# Patient Record
Sex: FEMALE | Race: WHITE
[De-identification: names, ages, dates, MRNs, and addresses within clinical notes are randomized per-mention and may not be internally consistent; named-entity substitution may affect disease eponyms.]

---
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
## 2020-01-21 ENCOUNTER — HOSPITAL ENCOUNTER (EMERGENCY)
Dept: HOSPITAL 47 - EC | Age: 20
Discharge: HOME | End: 2020-01-21
Payer: COMMERCIAL

## 2020-01-21 VITALS
DIASTOLIC BLOOD PRESSURE: 88 MMHG | TEMPERATURE: 98 F | HEART RATE: 108 BPM | SYSTOLIC BLOOD PRESSURE: 116 MMHG | RESPIRATION RATE: 19 BRPM

## 2020-01-21 DIAGNOSIS — O99.89: Primary | ICD-10-CM

## 2020-01-21 DIAGNOSIS — O21.9: ICD-10-CM

## 2020-01-21 DIAGNOSIS — R19.7: ICD-10-CM

## 2020-01-21 DIAGNOSIS — Z32.01: ICD-10-CM

## 2020-01-21 DIAGNOSIS — F17.200: ICD-10-CM

## 2020-01-21 DIAGNOSIS — Z90.49: ICD-10-CM

## 2020-01-21 DIAGNOSIS — Z91.030: ICD-10-CM

## 2020-01-21 DIAGNOSIS — Z87.19: ICD-10-CM

## 2020-01-21 DIAGNOSIS — O99.331: ICD-10-CM

## 2020-01-21 DIAGNOSIS — Z3A.01: ICD-10-CM

## 2020-01-21 DIAGNOSIS — R82.71: ICD-10-CM

## 2020-01-21 LAB
ALBUMIN SERPL-MCNC: 5 G/DL (ref 3.5–5)
ALP SERPL-CCNC: 64 U/L (ref 38–126)
ALT SERPL-CCNC: 34 U/L (ref 4–34)
ANION GAP SERPL CALC-SCNC: 12 MMOL/L
AST SERPL-CCNC: 37 U/L (ref 14–36)
BASOPHILS # BLD AUTO: 0.1 K/UL (ref 0–0.2)
BASOPHILS NFR BLD AUTO: 1 %
BUN SERPL-SCNC: 10 MG/DL (ref 7–17)
CALCIUM SPEC-MCNC: 9.5 MG/DL (ref 8.4–10.2)
CHLORIDE SERPL-SCNC: 108 MMOL/L (ref 98–107)
CO2 SERPL-SCNC: 20 MMOL/L (ref 22–30)
EOSINOPHIL # BLD AUTO: 0.6 K/UL (ref 0–0.7)
EOSINOPHIL NFR BLD AUTO: 6 %
ERYTHROCYTE [DISTWIDTH] IN BLOOD BY AUTOMATED COUNT: 4.75 M/UL (ref 3.8–5.4)
ERYTHROCYTE [DISTWIDTH] IN BLOOD: 12.5 % (ref 11.5–15.5)
GLUCOSE SERPL-MCNC: 95 MG/DL (ref 74–99)
HCG SERPL-MCNC: 46.6 MIU/ML
HCT VFR BLD AUTO: 43.6 % (ref 34–46)
HGB BLD-MCNC: 14.5 GM/DL (ref 11.4–16)
LYMPHOCYTES # SPEC AUTO: 3 K/UL (ref 1–4.8)
LYMPHOCYTES NFR SPEC AUTO: 29 %
MCH RBC QN AUTO: 30.5 PG (ref 25–35)
MCHC RBC AUTO-ENTMCNC: 33.2 G/DL (ref 31–37)
MCV RBC AUTO: 91.8 FL (ref 80–100)
MONOCYTES # BLD AUTO: 0.4 K/UL (ref 0–1)
MONOCYTES NFR BLD AUTO: 4 %
NEUTROPHILS # BLD AUTO: 6.1 K/UL (ref 1.3–7.7)
NEUTROPHILS NFR BLD AUTO: 59 %
PLATELET # BLD AUTO: 309 K/UL (ref 150–450)
POTASSIUM SERPL-SCNC: 4 MMOL/L (ref 3.5–5.1)
PROT SERPL-MCNC: 8.7 G/DL (ref 6.3–8.2)
SODIUM SERPL-SCNC: 140 MMOL/L (ref 137–145)
WBC # BLD AUTO: 10.4 K/UL (ref 4–11)

## 2020-01-21 PROCEDURE — 99284 EMERGENCY DEPT VISIT MOD MDM: CPT

## 2020-01-21 PROCEDURE — 85025 COMPLETE CBC W/AUTO DIFF WBC: CPT

## 2020-01-21 PROCEDURE — 86900 BLOOD TYPING SEROLOGIC ABO: CPT

## 2020-01-21 PROCEDURE — 86901 BLOOD TYPING SEROLOGIC RH(D): CPT

## 2020-01-21 PROCEDURE — 76801 OB US < 14 WKS SINGLE FETUS: CPT

## 2020-01-21 PROCEDURE — 80053 COMPREHEN METABOLIC PANEL: CPT

## 2020-01-21 PROCEDURE — 36415 COLL VENOUS BLD VENIPUNCTURE: CPT

## 2020-01-21 PROCEDURE — 76817 TRANSVAGINAL US OBSTETRIC: CPT

## 2020-01-21 PROCEDURE — 81025 URINE PREGNANCY TEST: CPT

## 2020-01-21 PROCEDURE — 84702 CHORIONIC GONADOTROPIN TEST: CPT

## 2020-01-21 NOTE — US
EXAMINATION TYPE: Transabdominal

 

DATE OF EXAM: 2020 7:34 PM

 

COMPARISON: TV US 2019.

 

CLINICAL HISTORY: +urine preg, abd pain. Abdominal pain x 1 week. Positive pregnancy test. Hx ovarian
 cyst. .

 

EXAM PERFORMED:  Transvaginal (TV) and Transabdominal (TA)

 

EXAM MEASUREMENTS:

 

GESTATIONAL AGE / DATING

 

Physician Established: Not yet established. 

Dates by LMP: (4 weeks/6 days)  

** EDC: 2020

Dates by First Scan:  This is first scan. 

Dates by Current Scan for: No gestational sac seen at this time.

 

MATERNAL ANATOMY 

 

Uterus: 6.9 x 3.9 x 3.4 cm. Anteverted. Appears heterogeneous. Subcentimeter anechoic-hypoechoic area
s seen in cervix.

Right Ovary: 3.4 x 2.3 x 1.6 cm. Follicles seen.

Left Ovary: 2.9 x 2.2 x 1.2 cm. Follicles seen. Largest anechoic area measures: 0.8 x 0.7 x 0.5 cm. 

Post CDS / Adnexa: Appear to be wnl.

Presence of free fluid: Not seen

Presence of corpus luteal cyst: Not seen

 

 

GESTATION / FETAL SURVEY

 

IUP:  No IUP seen at this time

 

Date of LMP: 2019

Beta HcG (if available):  Detected.

 

 

 

 

 

IMPRESSION:

No gestational sac seen. Small follicular ovarian cysts. No adnexal mass.

## 2020-01-21 NOTE — ED
Abdominal Pain HPI





- General


Chief Complaint: Abdominal Pain


Stated Complaint: Abdominal pain


Time Seen by Provider: 20 17:44


Source: patient


Mode of arrival: ambulatory


Limitations: no limitations





- History of Present Illness


Initial Comments: 





Patient is a 19-year-old female,  female presenting to the emergency 

department with a chief complaint of nausea vomiting 2 days.  Patient reports 

she has been "feeling off" over the past 2 days with some nausea but no 

vomiting.  Patient also reports some nonbloody diarrhea.  Patient denies any ch

ills, night sweats or fevers.  Patient states that her last period was over 1 

month.  She is concerned that she is pregnant.  Patient states that she took 3 

pregnancy tests at home and there were positive but she just wants to make sure.

 Patient denies increased urgency or frequency or dysuria.  Denies any vaginal 

bleeding or discharge.





- Related Data


                                Home Medications











 Medication  Instructions  Recorded  Confirmed


 


Norgestrel-Ethinyl Estradiol 1 tab PO DAILY@1800 19





[Low-Ogestrel-28 Tablet]   








                                  Previous Rx's











 Medication  Instructions  Recorded


 


Cephalexin [Keflex] 500 mg PO BID 5 Days #10 cap 20


 


Prenatal Vit-Iron-Folic Acid 1 each PO DAILY #30 cap 20





[Prenatal-U Capsule]  











                                    Allergies











Allergy/AdvReac Type Severity Reaction Status Date / Time


 


venom-honey bee Allergy Severe Rash/Hives Verified 20 17:18


 


bee pollen Allergy  Rash/Hives Verified 20 17:18














Review of Systems


ROS Statement: 


Those systems with pertinent positive or pertinent negative responses have been 

documented in the HPI.





ROS Other: All systems not noted in ROS Statement are negative.





Past Medical History


Past Medical History: GERD/Reflux


Additional Past Medical History / Comment(s): acid reflux. L5 fracture.


History of Any Multi-Drug Resistant Organisms: None Reported


Past Surgical History: Appendectomy, Tonsillectomy


Past Psychological History: Anxiety, Depression


Smoking Status: Current every day smoker


Past Alcohol Use History: Rare


Past Drug Use History: None Reported





General Exam


Limitations: no limitations


General appearance: alert, in no apparent distress


Head exam: Present: atraumatic, normocephalic, normal inspection


Eye exam: Present: normal appearance, PERRL, EOMI


Pupils: Present: normal accommodation


ENT exam: Present: normal exam, mucous membranes moist


Neck exam: Present: normal inspection, full ROM


Respiratory exam: Present: normal lung sounds bilaterally


Cardiovascular Exam: Present: regular rate, normal rhythm, normal heart sounds


GI/Abdominal exam: Present: soft.  Absent: distended, tenderness


Extremities exam: Present: normal inspection, full ROM


Back exam: Present: normal inspection, full ROM.  Absent: CVA tenderness (R), 

CVA tenderness (L)


Neurological exam: Present: alert, oriented X3


Psychiatric exam: Present: normal affect, normal mood


Skin exam: Present: warm, dry, intact, normal color





Course


                                   Vital Signs











  20





  17:16


 


Temperature 98.4 F


 


Pulse Rate 117 H


 


Respiratory 20





Rate 


 


Blood Pressure 132/78














Medical Decision Making





- Medical Decision Making





Patient is a 19-year-old female presenting to emergency Department with a chief 

complaint of nausea or vomiting.  Physical examination is unremarkable.  Patient

does have a positive urine called test.  Serum hCG is 46.  Decided ultrasound 

shows no intrauterine pregnancy at this time.  Patient does not have any vaginal

bleeding or discharge.  It could mean the patient is too early in her pregnancy 

to have anything detectable with ultrasound.  Patient advised to obtain repeat 

hCG and ultrasound within a week.  Patient was to follow-up with an OB.  Patient

given prenatal vitamins.  UA did show bacteria and elevated hCG and leukocyte 

esterase.  Patient will be treated for asymptomatic bacteriuria considering she 

could possibly be pregnant.  Patient will be treated with Keflex for 5 days.  

Strict return parameters were thoroughly discussed with patient was 

understanding and agreeable.  Case discussed with physician.





- Lab Data


Result diagrams: 


                                 20 20:10





                                 20 19:06


                                   Lab Results











  20 Range/Units





  17:31 19:06 19:06 


 


WBC     (4.0-11.0)  k/uL


 


RBC     (3.80-5.40)  m/uL


 


Hgb     (11.4-16.0)  gm/dL


 


Hct     (34.0-46.0)  %


 


MCV     (80.0-100.0)  fL


 


MCH     (25.0-35.0)  pg


 


MCHC     (31.0-37.0)  g/dL


 


RDW     (11.5-15.5)  %


 


Plt Count     (150-450)  k/uL


 


Neutrophils %     %


 


Lymphocytes %     %


 


Monocytes %     %


 


Eosinophils %     %


 


Basophils %     %


 


Neutrophils #     (1.3-7.7)  k/uL


 


Lymphocytes #     (1.0-4.8)  k/uL


 


Monocytes #     (0-1.0)  k/uL


 


Eosinophils #     (0-0.7)  k/uL


 


Basophils #     (0-0.2)  k/uL


 


Sodium    140  (137-145)  mmol/L


 


Potassium    4.0  (3.5-5.1)  mmol/L


 


Chloride    108 H  ()  mmol/L


 


Carbon Dioxide    20 L  (22-30)  mmol/L


 


Anion Gap    12  mmol/L


 


BUN    10  (7-17)  mg/dL


 


Creatinine    0.60  (0.52-1.04)  mg/dL


 


Est GFR (CKD-EPI)AfAm    >90  (>60 ml/min/1.73 sqM)  


 


Est GFR (CKD-EPI)NonAf    >90  (>60 ml/min/1.73 sqM)  


 


Glucose    95  (74-99)  mg/dL


 


Calcium    9.5  (8.4-10.2)  mg/dL


 


Total Bilirubin    0.7  (0.2-1.3)  mg/dL


 


AST    37 H  (14-36)  U/L


 


ALT    34  (4-34)  U/L


 


Alkaline Phosphatase    64  ()  U/L


 


Total Protein    8.7 H  (6.3-8.2)  g/dL


 


Albumin    5.0  (3.5-5.0)  g/dL


 


HCG, Quant    46.6  mIU/mL


 


Urine HCG, Qual  Detected    (Not Detectd)  


 


Blood Type   O Positive   


 


Blood Type Recheck   O Pos   


 


Bld Type Recheck Status   No   














  20 Range/Units





  20:10 


 


WBC  10.4  (4.0-11.0)  k/uL


 


RBC  4.75  (3.80-5.40)  m/uL


 


Hgb  14.5  (11.4-16.0)  gm/dL


 


Hct  43.6  (34.0-46.0)  %


 


MCV  91.8  (80.0-100.0)  fL


 


MCH  30.5  (25.0-35.0)  pg


 


MCHC  33.2  (31.0-37.0)  g/dL


 


RDW  12.5  (11.5-15.5)  %


 


Plt Count  309  (150-450)  k/uL


 


Neutrophils %  59  %


 


Lymphocytes %  29  %


 


Monocytes %  4  %


 


Eosinophils %  6  %


 


Basophils %  1  %


 


Neutrophils #  6.1  (1.3-7.7)  k/uL


 


Lymphocytes #  3.0  (1.0-4.8)  k/uL


 


Monocytes #  0.4  (0-1.0)  k/uL


 


Eosinophils #  0.6  (0-0.7)  k/uL


 


Basophils #  0.1  (0-0.2)  k/uL


 


Sodium   (137-145)  mmol/L


 


Potassium   (3.5-5.1)  mmol/L


 


Chloride   ()  mmol/L


 


Carbon Dioxide   (22-30)  mmol/L


 


Anion Gap   mmol/L


 


BUN   (7-17)  mg/dL


 


Creatinine   (0.52-1.04)  mg/dL


 


Est GFR (CKD-EPI)AfAm   (>60 ml/min/1.73 sqM)  


 


Est GFR (CKD-EPI)NonAf   (>60 ml/min/1.73 sqM)  


 


Glucose   (74-99)  mg/dL


 


Calcium   (8.4-10.2)  mg/dL


 


Total Bilirubin   (0.2-1.3)  mg/dL


 


AST   (14-36)  U/L


 


ALT   (4-34)  U/L


 


Alkaline Phosphatase   ()  U/L


 


Total Protein   (6.3-8.2)  g/dL


 


Albumin   (3.5-5.0)  g/dL


 


HCG, Quant   mIU/mL


 


Urine HCG, Qual   (Not Detectd)  


 


Blood Type   


 


Blood Type Recheck   


 


Bld Type Recheck Status   














Disposition


Clinical Impression: 


 Pregnancy confirmed by positive urine test, Asymptomatic bacteriuria





Disposition: HOME SELF-CARE


Condition: Stable


Instructions (If sedation given, give patient instructions):  Pregnancy (ED)


Additional Instructions: 


please take prescribed medication as directed.  Please follow with an OB.  

Obtain repeat ultrasound and hCG levels in a week.  Please return to emergency 

department if symptoms worsen.


Prescriptions: 


Prenatal Vit-Iron-Folic Acid [Prenatal-U Capsule] 1 each PO DAILY #30 cap


Is patient prescribed a controlled substance at d/c from ED?: No


Referrals: 


Luzma Pickard DO [Primary Care Provider] - 1-2 days


Arturo Ricardo MD [STAFF PHYSICIAN] - 1-2 days


Time of Disposition: 20:24

## 2020-01-23 ENCOUNTER — HOSPITAL ENCOUNTER (EMERGENCY)
Dept: HOSPITAL 47 - EC | Age: 20
LOS: 1 days | Discharge: HOME | End: 2020-01-24
Payer: COMMERCIAL

## 2020-01-23 DIAGNOSIS — O03.9: Primary | ICD-10-CM

## 2020-01-23 DIAGNOSIS — Z90.89: ICD-10-CM

## 2020-01-23 DIAGNOSIS — F17.200: ICD-10-CM

## 2020-01-23 DIAGNOSIS — Z91.030: ICD-10-CM

## 2020-01-23 LAB
PH UR: 6 [PH] (ref 5–8)
RBC UR QL: 51 /HPF (ref 0–5)
SP GR UR: 1.01 (ref 1–1.03)
SQUAMOUS UR QL AUTO: 8 /HPF (ref 0–4)
UROBILINOGEN UR QL STRIP: <2 MG/DL (ref ?–2)
WBC #/AREA URNS HPF: 15 /HPF (ref 0–5)

## 2020-01-23 PROCEDURE — 84702 CHORIONIC GONADOTROPIN TEST: CPT

## 2020-01-23 PROCEDURE — 36415 COLL VENOUS BLD VENIPUNCTURE: CPT

## 2020-01-23 PROCEDURE — 99284 EMERGENCY DEPT VISIT MOD MDM: CPT

## 2020-01-23 PROCEDURE — 87086 URINE CULTURE/COLONY COUNT: CPT

## 2020-01-23 PROCEDURE — 81001 URINALYSIS AUTO W/SCOPE: CPT

## 2020-01-23 NOTE — ED
Female Urogenital HPI





- General


Chief complaint: Abdominal Pain


Stated complaint: early pregnancy/vag bleeding


Time Seen by Provider: 20 22:35


Source: patient, RN notes reviewed, old records reviewed


Mode of arrival: ambulatory


Limitations: no limitations





- History of Present Illness


Initial comments: 





This is a 19-year-old female ER today she presents today for evaluation regards 

to recheck a pregnancy recheck beta-HCG with persistent bleeding, patient was 

here 2 days ago with a very low beta she had an ultrasound that was 

nondiagnostic patient states she has no significant change in symptoms, this 

will be her first pregnancy .  Some mild persistent bleeding, no feelings 

of lightheadedness dizziness or weakness.


MD Complaint: vaginal bleeding, other (Positive pregnancy, early pregnancy 

versus ectopic versus , miscarriage)


-: days(s)


Location: suprapubic


Severity: mild


Severity scale (1-10): 2


Quality: cramping


Consistency: intermittent


Improves with: none


Worsens with: none


Last Menstrual Period: 19


Patient Pregnant: Yes


Associated Symptoms: vaginal bleeding, abdominal pain





- Related Data


Sexually active: Yes


                                Home Medications











 Medication  Instructions  Recorded  Confirmed


 


Norgestrel-Ethinyl Estradiol 1 tab PO DAILY@1800 19





[Low-Ogestrel-28 Tablet]   








                                  Previous Rx's











 Medication  Instructions  Recorded


 


Cephalexin [Keflex] 500 mg PO BID 5 Days #10 cap 20


 


Prenatal Vit-Iron-Folic Acid 1 each PO DAILY #30 cap 20





[Prenatal-U Capsule]  











                                    Allergies











Allergy/AdvReac Type Severity Reaction Status Date / Time


 


venom-honey bee Allergy Severe Rash/Hives Verified 20 22:11


 


bee pollen Allergy  Rash/Hives Verified 20 22:11














Review of Systems


ROS Statement: 


Those systems with pertinent positive or pertinent negative responses have been 

documented in the HPI.





ROS Other: All systems not noted in ROS Statement are negative.





Past Medical History


Past Medical History: GERD/Reflux


Additional Past Medical History / Comment(s): acid reflux. L5 fracture.


History of Any Multi-Drug Resistant Organisms: None Reported


Past Surgical History: Appendectomy, Tonsillectomy


Past Psychological History: Anxiety, Depression


Smoking Status: Current some day smoker


Past Alcohol Use History: Rare


Past Drug Use History: None Reported





General Exam


Limitations: no limitations


General appearance: alert, in no apparent distress


Head exam: Present: atraumatic, normocephalic, normal inspection


Eye exam: Present: normal appearance, PERRL, EOMI.  Absent: scleral icterus, 

conjunctival injection, periorbital swelling


ENT exam: Present: normal exam, mucous membranes moist


Neck exam: Present: normal inspection.  Absent: tenderness, meningismus, 

lymphadenopathy


Respiratory exam: Present: normal lung sounds bilaterally.  Absent: respiratory 

distress, wheezes, rales, rhonchi, stridor


Cardiovascular Exam: Present: regular rate, normal rhythm, normal heart sounds. 

Absent: systolic murmur, diastolic murmur, rubs, gallop, clicks


GI/Abdominal exam: Present: soft, normal bowel sounds.  Absent: distended, 

tenderness, guarding, rebound, rigid


Extremities exam: Present: normal inspection, full ROM, normal capillary refill.

 Absent: tenderness, pedal edema, joint swelling, calf tenderness


Back exam: Present: normal inspection


Neurological exam: Present: alert, oriented X3, CN II-XII intact


Psychiatric exam: Present: normal affect, normal mood


Skin exam: Present: warm, dry, intact, normal color.  Absent: rash





Course


                                   Vital Signs











  20





  22:09 00:13


 


Temperature 98.2 F 98.0 F


 


Pulse Rate 109 H 116 H


 


Respiratory 20 18





Rate  


 


Blood Pressure 114/74 159/96


 


O2 Sat by Pulse 99 99





Oximetry  














- Reevaluation(s)


Reevaluation #1: 





Medical record prior ER visit are reviewed including previous beta





Beta today is lower than normal wall, lower than her prior beta, patient 

informed of active miscarriage is negative ultrasound complete miscarriage and 

, patient very emotional questions are answered patient is consoled








Medical Decision Making





- Medical Decision Making





19-year-old female the ER for evaluation of vaginal bleeding in early pregnancy.

 Patient is downtrending beta consistent with completed , negative 

ultrasound.  Patient will be discharged home





- Lab Data


                                   Lab Results











  20 Range/Units





  23:20 23:20 


 


HCG, Quant   32.9  mIU/mL


 


Urine Color  Yellow   


 


Urine Appearance  Cloudy H   (Clear)  


 


Urine pH  6.0   (5.0-8.0)  


 


Ur Specific Gravity  1.015   (1.001-1.035)  


 


Urine Protein  Trace H   (Negative)  


 


Urine Glucose (UA)  Negative   (Negative)  


 


Urine Ketones  Negative   (Negative)  


 


Urine Blood  Moderate H   (Negative)  


 


Urine Nitrite  Negative   (Negative)  


 


Urine Bilirubin  Negative   (Negative)  


 


Urine Urobilinogen  <2.0   (<2.0)  mg/dL


 


Ur Leukocyte Esterase  Small H   (Negative)  


 


Urine RBC  51 H   (0-5)  /hpf


 


Urine WBC  15 H   (0-5)  /hpf


 


Ur Squamous Epith Cells  8 H   (0-4)  /hpf


 


Urine Bacteria  Rare H   (None)  /hpf


 


Urine Mucus  Rare H   (None)  /hpf














Disposition


Clinical Impression: 


 Miscarriage





Disposition: HOME SELF-CARE


Condition: Good


Instructions (If sedation given, give patient instructions):  Miscarriage (ED)


Is patient prescribed a controlled substance at d/c from ED?: No


Referrals: 


Luzma Pickard DO [Primary Care Provider] - 1-2 days

## 2020-01-24 VITALS
DIASTOLIC BLOOD PRESSURE: 96 MMHG | TEMPERATURE: 98 F | SYSTOLIC BLOOD PRESSURE: 159 MMHG | HEART RATE: 116 BPM | RESPIRATION RATE: 18 BRPM

## 2020-01-31 ENCOUNTER — HOSPITAL ENCOUNTER (OUTPATIENT)
Dept: HOSPITAL 47 - LABWHC1 | Age: 20
Discharge: HOME | End: 2020-01-31
Attending: OBSTETRICS & GYNECOLOGY
Payer: COMMERCIAL

## 2020-01-31 DIAGNOSIS — O02.1: Primary | ICD-10-CM

## 2020-01-31 PROCEDURE — 84702 CHORIONIC GONADOTROPIN TEST: CPT

## 2020-01-31 PROCEDURE — 36415 COLL VENOUS BLD VENIPUNCTURE: CPT

## 2020-02-06 ENCOUNTER — HOSPITAL ENCOUNTER (OUTPATIENT)
Dept: HOSPITAL 47 - RAD | Age: 20
Discharge: HOME | End: 2020-02-06
Payer: COMMERCIAL

## 2020-02-06 DIAGNOSIS — S67.191A: Primary | ICD-10-CM

## 2020-02-06 NOTE — XR
EXAMINATION TYPE: XR hand complete LT

 

DATE OF EXAM: 2/6/2020

 

CLINICAL HISTORY: Left hand injury and pain

 

TECHNIQUE:  Frontal, lateral and oblique images of the left hand are obtained.

 

COMPARISON: None.

 

FINDINGS:  There is no acute fracture/dislocation evident in the left hand. The joint spaces in the l
eft hand appear within normal limits.  The overlying soft tissue appears unremarkable.

 

IMPRESSION:  There is no acute fracture or dislocation in the left hand.

## 2020-06-09 ENCOUNTER — HOSPITAL ENCOUNTER (EMERGENCY)
Dept: HOSPITAL 47 - EC | Age: 20
Discharge: HOME | End: 2020-06-09
Payer: COMMERCIAL

## 2020-06-09 VITALS — TEMPERATURE: 97.6 F | HEART RATE: 100 BPM | DIASTOLIC BLOOD PRESSURE: 74 MMHG | SYSTOLIC BLOOD PRESSURE: 106 MMHG

## 2020-06-09 VITALS — RESPIRATION RATE: 18 BRPM

## 2020-06-09 DIAGNOSIS — Z90.89: ICD-10-CM

## 2020-06-09 DIAGNOSIS — F17.200: ICD-10-CM

## 2020-06-09 DIAGNOSIS — Z79.3: ICD-10-CM

## 2020-06-09 DIAGNOSIS — Z91.030: ICD-10-CM

## 2020-06-09 DIAGNOSIS — R10.819: ICD-10-CM

## 2020-06-09 DIAGNOSIS — L03.90: Primary | ICD-10-CM

## 2020-06-09 LAB
ALBUMIN SERPL-MCNC: 4.4 G/DL (ref 3.5–5)
ALP SERPL-CCNC: 64 U/L (ref 38–126)
ALT SERPL-CCNC: 12 U/L (ref 4–34)
AMYLASE SERPL-CCNC: 40 U/L (ref 30–110)
ANION GAP SERPL CALC-SCNC: 8 MMOL/L
AST SERPL-CCNC: 19 U/L (ref 14–36)
BASOPHILS # BLD AUTO: 0.1 K/UL (ref 0–0.2)
BASOPHILS NFR BLD AUTO: 1 %
BUN SERPL-SCNC: 9 MG/DL (ref 7–17)
CALCIUM SPEC-MCNC: 9.5 MG/DL (ref 8.4–10.2)
CHLORIDE SERPL-SCNC: 107 MMOL/L (ref 98–107)
CO2 SERPL-SCNC: 25 MMOL/L (ref 22–30)
EOSINOPHIL # BLD AUTO: 0.9 K/UL (ref 0–0.7)
EOSINOPHIL NFR BLD AUTO: 9 %
ERYTHROCYTE [DISTWIDTH] IN BLOOD BY AUTOMATED COUNT: 5.18 M/UL (ref 3.8–5.4)
ERYTHROCYTE [DISTWIDTH] IN BLOOD: 12.8 % (ref 11.5–15.5)
GLUCOSE SERPL-MCNC: 102 MG/DL (ref 74–99)
HCT VFR BLD AUTO: 47.3 % (ref 34–46)
HGB BLD-MCNC: 14.8 GM/DL (ref 11.4–16)
LYMPHOCYTES # SPEC AUTO: 3.9 K/UL (ref 1–4.8)
LYMPHOCYTES NFR SPEC AUTO: 38 %
MCH RBC QN AUTO: 28.5 PG (ref 25–35)
MCHC RBC AUTO-ENTMCNC: 31.2 G/DL (ref 31–37)
MCV RBC AUTO: 91.4 FL (ref 80–100)
MONOCYTES # BLD AUTO: 0.4 K/UL (ref 0–1)
MONOCYTES NFR BLD AUTO: 4 %
NEUTROPHILS # BLD AUTO: 4.6 K/UL (ref 1.3–7.7)
NEUTROPHILS NFR BLD AUTO: 45 %
PH UR: 5.5 [PH] (ref 5–8)
PLATELET # BLD AUTO: 302 K/UL (ref 150–450)
POTASSIUM SERPL-SCNC: 3.8 MMOL/L (ref 3.5–5.1)
PROT SERPL-MCNC: 7.7 G/DL (ref 6.3–8.2)
RBC UR QL: 3 /HPF (ref 0–5)
SODIUM SERPL-SCNC: 140 MMOL/L (ref 137–145)
SP GR UR: 1.02 (ref 1–1.03)
SQUAMOUS UR QL AUTO: 1 /HPF (ref 0–4)
UROBILINOGEN UR QL STRIP: 2 MG/DL (ref ?–2)
WBC # BLD AUTO: 10.2 K/UL (ref 4–11)
WBC #/AREA URNS HPF: 1 /HPF (ref 0–5)

## 2020-06-09 PROCEDURE — 96374 THER/PROPH/DIAG INJ IV PUSH: CPT

## 2020-06-09 PROCEDURE — 83690 ASSAY OF LIPASE: CPT

## 2020-06-09 PROCEDURE — 85025 COMPLETE CBC W/AUTO DIFF WBC: CPT

## 2020-06-09 PROCEDURE — 99284 EMERGENCY DEPT VISIT MOD MDM: CPT

## 2020-06-09 PROCEDURE — 81025 URINE PREGNANCY TEST: CPT

## 2020-06-09 PROCEDURE — 80053 COMPREHEN METABOLIC PANEL: CPT

## 2020-06-09 PROCEDURE — 82150 ASSAY OF AMYLASE: CPT

## 2020-06-09 PROCEDURE — 74176 CT ABD & PELVIS W/O CONTRAST: CPT

## 2020-06-09 PROCEDURE — 81001 URINALYSIS AUTO W/SCOPE: CPT

## 2020-06-09 PROCEDURE — 36415 COLL VENOUS BLD VENIPUNCTURE: CPT

## 2020-06-09 NOTE — CT
EXAMINATION TYPE: CT abdomen pelvis wo con

 

DATE OF EXAM: 6/9/2020

 

COMPARISON: 6/18/2018

 

HISTORY: ABD PAIN X 6 DAYS

 

CT DLP: 407.6 mGycm

Automated exposure control for dose reduction was used.

 

Images obtained without contrast from the diaphragm to the floor the pelvis.

 

The lung bases are clear. There is no pleural effusion. Heart size is normal. There is no pericardial
 effusion.

 

Liver spleen stomach pancreas gallbladder appear normal. Bile ducts are not dilated.

 

There is no adrenal mass. Kidneys have normal size. There is 2 mm calcification in the central left k
idney. Ureters are not dilated. There is no retroperitoneal adenopathy. Bladder is almost empty. Uter
us is anteverted. There is no free fluid in the pelvis.

 

There is no evidence of a pelvic mass. There is no inguinal hernia. There is no mesenteric edema. The
re is no ascites or free air. There is no sign of a bowel obstruction. There is small density that is
 probably clips from appendectomy. Appendix not seen.

 

Lumbar vertebra have normal spacing and alignment. There is no compression fracture. There is bilater
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 The bony pelvis intact.

 

IMPRESSION:

Nonobstructing small left renal calculus. There is clearing of right renal calculus compared to old e
xam. No evidence of renal obstruction. No sign of acute abdomen and pelvis.

## 2020-06-09 NOTE — ED
Abdominal Pain HPI





- General


Chief Complaint: Abdominal Pain


Stated Complaint: Abd swelling, vomiting


Time Seen by Provider: 20 00:40


Source: patient


Mode of arrival: ambulatory


Limitations: no limitations





- History of Present Illness


MD Complaint: abdominal pain


Onset/Timin


-: days(s)


Location: periumbilical


Radiation: none


Migration to: no migration


Severity: moderate


Quality: sharp


Consistency: constant


Improves With: nothing


Worsens With: other (Palpation)


Associated Symptoms: vomiting





- Related Data


LMP (females 10-50): this week


Patient Pregnant: No


                                Home Medications











 Medication  Instructions  Recorded  Confirmed


 


Norgestrel-Ethinyl Estradiol 1 tab PO DAILY@1800 19





[Low-Ogestrel-28 Tablet]   








                                  Previous Rx's











 Medication  Instructions  Recorded


 


Cephalexin [Keflex] 500 mg PO BID 5 Days #10 cap 20


 


Prenatal Vit-Iron-Folic Acid 1 each PO DAILY #30 cap 20





[Prenatal-U Capsule]  


 


Cephalexin [Keflex] 500 mg PO Q6HR #28 cap 20


 


Ibuprofen [Motrin] 600 mg PO Q8HR PRN #20 tab 20











                                    Allergies











Allergy/AdvReac Type Severity Reaction Status Date / Time


 


venom-honey bee Allergy Severe Rash/Hives Verified 20 00:31


 


bee pollen Allergy  Rash/Hives Verified 20 00:31














Review of Systems


ROS Statement: 


Those systems with pertinent positive or pertinent negative responses have been 

documented in the HPI.





ROS Other: All systems not noted in ROS Statement are negative.


Constitutional: Denies: fever, chills


Respiratory: Denies: cough, dyspnea


Cardiovascular: Denies: chest pain, palpitations


Gastrointestinal: Reports: abdominal pain, nausea, vomiting.  Denies: diarrhea, 

constipation, hematemesis, melena, hematochezia


Genitourinary: Denies: dysuria, frequency, hematuria, abnormal menses


Musculoskeletal: Denies: back pain


Skin: Denies: rash


Neurological: Denies: headache, weakness, numbness





Past Medical History


Past Medical History: GERD/Reflux


Additional Past Medical History / Comment(s): acid reflux. L5 fracture.


History of Any Multi-Drug Resistant Organisms: None Reported


Past Surgical History: Appendectomy, Tonsillectomy


Past Psychological History: Anxiety, Depression


Smoking Status: Current some day smoker


Past Alcohol Use History: Rare


Past Drug Use History: None Reported





General Exam


Limitations: no limitations


General appearance: alert, in no apparent distress


Head exam: Present: atraumatic, normocephalic


Eye exam: Present: normal appearance.  Absent: scleral icterus, conjunctival 

injection


ENT exam: Present: normal oropharynx


Respiratory exam: Present: normal lung sounds bilaterally.  Absent: respiratory 

distress, wheezes, rales, rhonchi, stridor


Cardiovascular Exam: Present: regular rate, normal rhythm, normal heart sounds. 

 Absent: systolic murmur, diastolic murmur, rubs, gallop


GI/Abdominal exam: Present: soft, tenderness, normal bowel sounds.  Absent: 

distended, guarding, rebound, rigid, mass, pulsatile mass


Extremities exam: Present: normal inspection, normal capillary refill.  Absent: 

pedal edema, calf tenderness


Back exam: Present: normal inspection.  Absent: CVA tenderness (R), CVA tendern

ess (L)


Neurological exam: Present: alert


Skin exam: Present: warm, dry, intact, normal color.  Absent: rash





Course


                                   Vital Signs











  20





  00:31


 


Temperature 98.2 F


 


Pulse Rate 113 H


 


Respiratory 18





Rate 


 


Blood Pressure 147/79


 


O2 Sat by Pulse 98





Oximetry 














Medical Decision Making





- Lab Data


Result diagrams: 


                                 20 01:05





                                 20 01:05


                                   Lab Results











  20 Range/Units





  00:58 00:58 01:05 


 


WBC    10.2  (4.0-11.0)  k/uL


 


RBC    5.18  (3.80-5.40)  m/uL


 


Hgb    14.8  (11.4-16.0)  gm/dL


 


Hct    47.3 H  (34.0-46.0)  %


 


MCV    91.4  (80.0-100.0)  fL


 


MCH    28.5  (25.0-35.0)  pg


 


MCHC    31.2  (31.0-37.0)  g/dL


 


RDW    12.8  (11.5-15.5)  %


 


Plt Count    302  (150-450)  k/uL


 


Neutrophils %    45  %


 


Lymphocytes %    38  %


 


Monocytes %    4  %


 


Eosinophils %    9  %


 


Basophils %    1  %


 


Neutrophils #    4.6  (1.3-7.7)  k/uL


 


Lymphocytes #    3.9  (1.0-4.8)  k/uL


 


Monocytes #    0.4  (0-1.0)  k/uL


 


Eosinophils #    0.9 H  (0-0.7)  k/uL


 


Basophils #    0.1  (0-0.2)  k/uL


 


Sodium     (137-145)  mmol/L


 


Potassium     (3.5-5.1)  mmol/L


 


Chloride     ()  mmol/L


 


Carbon Dioxide     (22-30)  mmol/L


 


Anion Gap     mmol/L


 


BUN     (7-17)  mg/dL


 


Creatinine     (0.52-1.04)  mg/dL


 


Est GFR (CKD-EPI)AfAm     (>60 ml/min/1.73 sqM)  


 


Est GFR (CKD-EPI)NonAf     (>60 ml/min/1.73 sqM)  


 


Glucose     (74-99)  mg/dL


 


Calcium     (8.4-10.2)  mg/dL


 


Total Bilirubin     (0.2-1.3)  mg/dL


 


AST     (14-36)  U/L


 


ALT     (4-34)  U/L


 


Alkaline Phosphatase     ()  U/L


 


Total Protein     (6.3-8.2)  g/dL


 


Albumin     (3.5-5.0)  g/dL


 


Amylase     ()  U/L


 


Lipase     ()  U/L


 


Urine Color  Yellow    


 


Urine Appearance  Cloudy H    (Clear)  


 


Urine pH  5.5    (5.0-8.0)  


 


Ur Specific Gravity  1.023    (1.001-1.035)  


 


Urine Protein  Negative    (Negative)  


 


Urine Glucose (UA)  Negative    (Negative)  


 


Urine Ketones  Negative    (Negative)  


 


Urine Blood  Small H    (Negative)  


 


Urine Nitrite  Negative    (Negative)  


 


Urine Bilirubin  Negative    (Negative)  


 


Urine Urobilinogen  2.0    (<2.0)  mg/dL


 


Ur Leukocyte Esterase  Negative    (Negative)  


 


Urine RBC  3    (0-5)  /hpf


 


Urine WBC  1    (0-5)  /hpf


 


Ur Squamous Epith Cells  1    (0-4)  /hpf


 


Calcium Oxalate Crystal  Many H    (None)  /hpf


 


Urine Mucus  Moderate H    (None)  /hpf


 


Urine HCG, Qual   Not Detected   (Not Detectd)  














  20 Range/Units





  01:05 


 


WBC   (4.0-11.0)  k/uL


 


RBC   (3.80-5.40)  m/uL


 


Hgb   (11.4-16.0)  gm/dL


 


Hct   (34.0-46.0)  %


 


MCV   (80.0-100.0)  fL


 


MCH   (25.0-35.0)  pg


 


MCHC   (31.0-37.0)  g/dL


 


RDW   (11.5-15.5)  %


 


Plt Count   (150-450)  k/uL


 


Neutrophils %   %


 


Lymphocytes %   %


 


Monocytes %   %


 


Eosinophils %   %


 


Basophils %   %


 


Neutrophils #   (1.3-7.7)  k/uL


 


Lymphocytes #   (1.0-4.8)  k/uL


 


Monocytes #   (0-1.0)  k/uL


 


Eosinophils #   (0-0.7)  k/uL


 


Basophils #   (0-0.2)  k/uL


 


Sodium  140  (137-145)  mmol/L


 


Potassium  3.8  (3.5-5.1)  mmol/L


 


Chloride  107  ()  mmol/L


 


Carbon Dioxide  25  (22-30)  mmol/L


 


Anion Gap  8  mmol/L


 


BUN  9  (7-17)  mg/dL


 


Creatinine  0.65  (0.52-1.04)  mg/dL


 


Est GFR (CKD-EPI)AfAm  >90  (>60 ml/min/1.73 sqM)  


 


Est GFR (CKD-EPI)NonAf  >90  (>60 ml/min/1.73 sqM)  


 


Glucose  102 H  (74-99)  mg/dL


 


Calcium  9.5  (8.4-10.2)  mg/dL


 


Total Bilirubin  <0.1 L  (0.2-1.3)  mg/dL


 


AST  19  (14-36)  U/L


 


ALT  12  (4-34)  U/L


 


Alkaline Phosphatase  64  ()  U/L


 


Total Protein  7.7  (6.3-8.2)  g/dL


 


Albumin  4.4  (3.5-5.0)  g/dL


 


Amylase  40  ()  U/L


 


Lipase  92  ()  U/L


 


Urine Color   


 


Urine Appearance   (Clear)  


 


Urine pH   (5.0-8.0)  


 


Ur Specific Gravity   (1.001-1.035)  


 


Urine Protein   (Negative)  


 


Urine Glucose (UA)   (Negative)  


 


Urine Ketones   (Negative)  


 


Urine Blood   (Negative)  


 


Urine Nitrite   (Negative)  


 


Urine Bilirubin   (Negative)  


 


Urine Urobilinogen   (<2.0)  mg/dL


 


Ur Leukocyte Esterase   (Negative)  


 


Urine RBC   (0-5)  /hpf


 


Urine WBC   (0-5)  /hpf


 


Ur Squamous Epith Cells   (0-4)  /hpf


 


Calcium Oxalate Crystal   (None)  /hpf


 


Urine Mucus   (None)  /hpf


 


Urine HCG, Qual   (Not Detectd)  














Disposition


Clinical Impression: 


 Cellulitis





Disposition: HOME SELF-CARE


Condition: Good


Instructions (If sedation given, give patient instructions):  Cellulitis (ED)


Prescriptions: 


Cephalexin [Keflex] 500 mg PO Q6HR #28 cap


Ibuprofen [Motrin] 600 mg PO Q8HR PRN #20 tab


 PRN Reason: Pain


Is patient prescribed a controlled substance at d/c from ED?: No


Referrals: 


Luzma Pickard DO [Primary Care Provider] - 1-2 days

## 2020-10-14 ENCOUNTER — HOSPITAL ENCOUNTER (OUTPATIENT)
Dept: HOSPITAL 47 - RADUSWWP | Age: 20
Discharge: HOME | End: 2020-10-14
Attending: FAMILY MEDICINE
Payer: COMMERCIAL

## 2020-10-14 DIAGNOSIS — R10.2: Primary | ICD-10-CM

## 2020-10-14 PROCEDURE — 76830 TRANSVAGINAL US NON-OB: CPT

## 2020-10-14 NOTE — US
EXAMINATION TYPE: US transvaginal

 

DATE OF EXAM: 10/14/2020

 

COMPARISON: Pelvic ultrasound 2020

 

CLINICAL HISTORY: R10.2 Pelvic pain. Daily pelvic cramping x 9 months, occasional pain during interco
urse,  1, para 1

 

TECHNIQUE: Transvaginal only per ordering physician.

 

Date of LMP:  2020

 

EXAM MEASUREMENTS:

 

Uterus:  7.1 x 3.4 x 4.6 cm

Endometrial Stripe: 0.6 cm

Right Ovary:  1.8 x 1.1 x 2.1 cm

Left Ovary:  3.7 x 3.0 x 2.9 cm

 

 

 

1. Uterus:  Anteverted. Normal. Cervical nabothian cysts.

2. Endometrium:  Normal.

3. Right Ovary:  Normal. Follicular changes.

4. Left Ovary:  Normal. Follicular changes. Corpus luteum measures up to 2.8 cm.

5. Bilateral Adnexa:  Normal.

6. Posterior cul-de-sac:  Trace amount of free fluid, likely physiologic.

 

 

 

IMPRESSION: 

1. No acute findings to explain patient's pain.

2. Normal uterus and bilateral ovaries.

3. Trace pelvic free fluid likely physiologic.

## 2020-12-18 ENCOUNTER — HOSPITAL ENCOUNTER (EMERGENCY)
Dept: HOSPITAL 47 - EC | Age: 20
Discharge: HOME | End: 2020-12-18
Payer: COMMERCIAL

## 2020-12-18 VITALS
DIASTOLIC BLOOD PRESSURE: 94 MMHG | SYSTOLIC BLOOD PRESSURE: 135 MMHG | RESPIRATION RATE: 20 BRPM | TEMPERATURE: 98.4 F | HEART RATE: 95 BPM

## 2020-12-18 DIAGNOSIS — N93.9: Primary | ICD-10-CM

## 2020-12-18 DIAGNOSIS — F17.200: ICD-10-CM

## 2020-12-18 DIAGNOSIS — Z91.030: ICD-10-CM

## 2020-12-18 LAB
ALBUMIN SERPL-MCNC: 4.7 G/DL (ref 3.5–5)
ALP SERPL-CCNC: 65 U/L (ref 38–126)
ALT SERPL-CCNC: 15 U/L (ref 4–34)
ANION GAP SERPL CALC-SCNC: 8 MMOL/L
AST SERPL-CCNC: 23 U/L (ref 14–36)
BASOPHILS # BLD AUTO: 0.1 K/UL (ref 0–0.2)
BASOPHILS NFR BLD AUTO: 1 %
BUN SERPL-SCNC: 12 MG/DL (ref 7–17)
CALCIUM SPEC-MCNC: 9.8 MG/DL (ref 8.4–10.2)
CHLORIDE SERPL-SCNC: 107 MMOL/L (ref 98–107)
CO2 SERPL-SCNC: 24 MMOL/L (ref 22–30)
EOSINOPHIL # BLD AUTO: 0.7 K/UL (ref 0–0.7)
EOSINOPHIL NFR BLD AUTO: 7 %
ERYTHROCYTE [DISTWIDTH] IN BLOOD BY AUTOMATED COUNT: 4.76 M/UL (ref 3.8–5.4)
ERYTHROCYTE [DISTWIDTH] IN BLOOD: 13.1 % (ref 11.5–15.5)
GLUCOSE SERPL-MCNC: 82 MG/DL (ref 74–99)
HCG SERPL-MCNC: <2.4 MIU/ML
HCT VFR BLD AUTO: 43 % (ref 34–46)
HGB BLD-MCNC: 14.1 GM/DL (ref 11.4–16)
LYMPHOCYTES # SPEC AUTO: 3.5 K/UL (ref 1–4.8)
LYMPHOCYTES NFR SPEC AUTO: 32 %
MCH RBC QN AUTO: 29.6 PG (ref 25–35)
MCHC RBC AUTO-ENTMCNC: 32.8 G/DL (ref 31–37)
MCV RBC AUTO: 90.4 FL (ref 80–100)
MONOCYTES # BLD AUTO: 0.5 K/UL (ref 0–1)
MONOCYTES NFR BLD AUTO: 4 %
NEUTROPHILS # BLD AUTO: 6.1 K/UL (ref 1.3–7.7)
NEUTROPHILS NFR BLD AUTO: 55 %
PLATELET # BLD AUTO: 285 K/UL (ref 150–450)
POTASSIUM SERPL-SCNC: 3.6 MMOL/L (ref 3.5–5.1)
PROT SERPL-MCNC: 7.8 G/DL (ref 6.3–8.2)
SODIUM SERPL-SCNC: 139 MMOL/L (ref 137–145)
WBC # BLD AUTO: 11.1 K/UL (ref 4–11)

## 2020-12-18 PROCEDURE — 80053 COMPREHEN METABOLIC PANEL: CPT

## 2020-12-18 PROCEDURE — 85025 COMPLETE CBC W/AUTO DIFF WBC: CPT

## 2020-12-18 PROCEDURE — 99284 EMERGENCY DEPT VISIT MOD MDM: CPT

## 2020-12-18 PROCEDURE — 86900 BLOOD TYPING SEROLOGIC ABO: CPT

## 2020-12-18 PROCEDURE — 76801 OB US < 14 WKS SINGLE FETUS: CPT

## 2020-12-18 PROCEDURE — 84702 CHORIONIC GONADOTROPIN TEST: CPT

## 2020-12-18 PROCEDURE — 86901 BLOOD TYPING SEROLOGIC RH(D): CPT

## 2020-12-18 PROCEDURE — 96360 HYDRATION IV INFUSION INIT: CPT

## 2020-12-18 PROCEDURE — 76817 TRANSVAGINAL US OBSTETRIC: CPT

## 2020-12-18 PROCEDURE — 36415 COLL VENOUS BLD VENIPUNCTURE: CPT

## 2020-12-18 NOTE — US
EXAMINATION TYPE: Transabdominal

 

DATE OF EXAM: 12/18/2020 8:24 PM

 

COMPARISON: NONE

 

CLINICAL HISTORY: pain. Spotting

 

EXAM PERFORMED:  Transvaginal (TV) and Transabdominal (TA)

 

EXAM MEASUREMENTS:

 

GESTATIONAL AGE / DATING

 

Physician Established: Not yet established (

Dates by LMP:  11/17/2020 (4 weeks/3 days)  

** EDC: 08/24/2021

Dates by First Scan:  No previous this is first scan  

Dates by Current Scan for: No IUP seen at this time  

 

 

MATERNAL ANATOMY 

 

Uterus: 5.4 x 3.7 x 3.7 cm 

Right Ovary: 2.8 x 1.4 x 1.8 cm 

Left Ovary: 2.6 x 1.5 x 2.0 cm 

Post CDS / Adnexa: wnl

Presence of free fluid: no

Presence of corpus luteal cyst: no

Presence of subchorionic bleed: no

 

GESTATION / FETAL SURVEY

 

 

IUP:  No IUP seen at this time

 

 

 

Beta HcG (if available): Not available at this time

 

 

 

 

 

IMPRESSION:

Uterus is empty. No adnexal mass or free fluid.

## 2020-12-18 NOTE — ED
Female Urogenital HPI





- General


Chief complaint: Vaginal Bleeding


Stated complaint: Vaginal Bleeding,Pregnant


Time Seen by Provider: 20 19:41


Source: patient


Mode of arrival: ambulatory


Limitations: no limitations





- History of Present Illness


Initial comments: 


 presenting for vaginal bleeding with positive pregnancy test. pt states 

that she is 1 day late for her period and took a pregnancy test that was 

positive. pt states today she had light pink spotting. pt denies pain. denies 

additional complaints. pt states she is concerning she is miscarrying as she did

earlier this year. patient has no additional complaints. upon arrival she does n

ot appear in distress.





Last Menstrual Period: 20





- Related Data


                                Home Medications











 Medication  Instructions  Recorded  Confirmed


 


Acetaminophen [Tylenol] 650 mg PO Q4H PRN 20











                                    Allergies











Allergy/AdvReac Type Severity Reaction Status Date / Time


 


venom-honey bee Allergy Severe Rash/Hives Verified 20 20:45


 


bee pollen Allergy  Rash/Hives Verified 20 20:45














Review of Systems


ROS Statement: 


Those systems with pertinent positive or pertinent negative responses have been 

documented in the HPI.





ROS Other: All systems not noted in ROS Statement are negative.





Past Medical History


Past Medical History: GERD/Reflux


Additional Past Medical History / Comment(s): acid reflux. L5 fracture.


History of Any Multi-Drug Resistant Organisms: None Reported


Past Surgical History: Appendectomy, Tonsillectomy


Past Psychological History: Anxiety, Depression


Smoking Status: Current every day smoker


Past Alcohol Use History: None Reported


Past Drug Use History: None Reported





General Exam





- General Exam Comments


Initial Comments: 


General:  The patient is awake and alert, in no distress, and does not appear 

acutely ill. 


Eye:  Pupils are equal, round and reactive to light, extra-ocular movements are 

intact.  No nystagmus.  There is normal conjunctiva bilaterally.  No signs of 

icterus.  


Cardiovascular:  There is a regular rate and rhythm. No murmur, rub or gallop is

 appreciated.


Respiratory:  Lungs are clear to auscultation, respirations are non-labored, 

breath sounds are equal.  No wheezes, stridor, rales, or rhonchi.


Gastrointestinal:  Soft, non-distended, non-tender abdomen without masses or 

organomegaly noted. There is no rebound or guarding present. 


: refused pelvic


Musculoskeletal:  Normal ROM, no tenderness.  Strength 5/5. Sensation intact. 

Radial pulses equal bilaterally 2+.  


Neurological:  A&O x 3. CN II-XII intact grossly, There are no obvious motor or 

sensory deficits. Coordination appears grossly intact. Speech is normal.


Skin:  Skin is warm and dry and no rashes or lesions are noted. 


Psychiatric:  Cooperative, appropriate mood & affect, normal judgment.  





Limitations: no limitations





Course


                                   Vital Signs











  20





  19:37 21:23


 


Temperature 98.0 F 98.4 F


 


Pulse Rate 97 95


 


Respiratory 18 20





Rate  


 


Blood Pressure 138/65 135/94


 


O2 Sat by Pulse 99 99





Oximetry  














Medical Decision Making





- Medical Decision Making


labs stable. hcg serum (-) normal US. patient period about 1 month ago, feel at 

this time most likely false + but recommend outpatient testing in 1 week. return

 for worsening bleeding. pt refused pelvic








- Lab Data


Result diagrams: 


                                 20 19:58





                                 20 19:58


                                   Lab Results











  20 Range/Units





  19:58 19:58 19:58 


 


WBC  11.1 H    (4.0-11.0)  k/uL


 


RBC  4.76    (3.80-5.40)  m/uL


 


Hgb  14.1    (11.4-16.0)  gm/dL


 


Hct  43.0    (34.0-46.0)  %


 


MCV  90.4    (80.0-100.0)  fL


 


MCH  29.6    (25.0-35.0)  pg


 


MCHC  32.8    (31.0-37.0)  g/dL


 


RDW  13.1    (11.5-15.5)  %


 


Plt Count  285    (150-450)  k/uL


 


MPV  7.4    


 


Neutrophils %  55    %


 


Lymphocytes %  32    %


 


Monocytes %  4    %


 


Eosinophils %  7    %


 


Basophils %  1    %


 


Neutrophils #  6.1    (1.3-7.7)  k/uL


 


Lymphocytes #  3.5    (1.0-4.8)  k/uL


 


Monocytes #  0.5    (0-1.0)  k/uL


 


Eosinophils #  0.7    (0-0.7)  k/uL


 


Basophils #  0.1    (0-0.2)  k/uL


 


Sodium   139   (137-145)  mmol/L


 


Potassium   3.6   (3.5-5.1)  mmol/L


 


Chloride   107   ()  mmol/L


 


Carbon Dioxide   24   (22-30)  mmol/L


 


Anion Gap   8   mmol/L


 


BUN   12   (7-17)  mg/dL


 


Creatinine   0.66   (0.52-1.04)  mg/dL


 


Est GFR (CKD-EPI)AfAm   >90   (>60 ml/min/1.73 sqM)  


 


Est GFR (CKD-EPI)NonAf   >90   (>60 ml/min/1.73 sqM)  


 


Glucose   82   (74-99)  mg/dL


 


Calcium   9.8   (8.4-10.2)  mg/dL


 


Total Bilirubin   0.3   (0.2-1.3)  mg/dL


 


AST   23   (14-36)  U/L


 


ALT   15   (4-34)  U/L


 


Alkaline Phosphatase   65   ()  U/L


 


Total Protein   7.8   (6.3-8.2)  g/dL


 


Albumin   4.7   (3.5-5.0)  g/dL


 


HCG, Quant   <2.4   mIU/mL


 


Blood Type    O Positive  


 


Blood Type Recheck    O Pos  


 


Bld Type Recheck Status    No  














Disposition


Clinical Impression: 


 Vaginal bleeding





Disposition: HOME SELF-CARE


Condition: Good


Additional Instructions: 


Please use medication as discussed.  Please follow-up with family doctor in the 

next 2 days, repeat home pregnancy test next week do no leave out for more than 

3 minutes.  Please return to emergency room if the symptoms increase or worsen 

or for any other concerns.


Is patient prescribed a controlled substance at d/c from ED?: No


Referrals: 


Luzma Pickard DO [Primary Care Provider] - 1-2 days


Time of Disposition: 21:11

## 2021-01-13 ENCOUNTER — HOSPITAL ENCOUNTER (EMERGENCY)
Dept: HOSPITAL 47 - EC | Age: 21
Discharge: HOME | End: 2021-01-13
Payer: COMMERCIAL

## 2021-01-13 VITALS
DIASTOLIC BLOOD PRESSURE: 72 MMHG | SYSTOLIC BLOOD PRESSURE: 105 MMHG | RESPIRATION RATE: 18 BRPM | HEART RATE: 90 BPM | TEMPERATURE: 98.3 F
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
## 2021-01-13 DIAGNOSIS — F17.200: ICD-10-CM

## 2021-01-13 DIAGNOSIS — S06.0X9A: Primary | ICD-10-CM

## 2021-01-13 DIAGNOSIS — Z91.030: ICD-10-CM

## 2021-01-13 DIAGNOSIS — S20.212A: ICD-10-CM

## 2021-01-13 PROCEDURE — 81025 URINE PREGNANCY TEST: CPT

## 2021-01-13 PROCEDURE — 99284 EMERGENCY DEPT VISIT MOD MDM: CPT

## 2021-01-13 PROCEDURE — 72125 CT NECK SPINE W/O DYE: CPT

## 2021-01-13 PROCEDURE — 70450 CT HEAD/BRAIN W/O DYE: CPT
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
## 2021-04-07 ENCOUNTER — HOSPITAL ENCOUNTER (EMERGENCY)
Dept: HOSPITAL 47 - EC | Age: 21
Discharge: HOME | End: 2021-04-07
Payer: COMMERCIAL

## 2021-04-07 VITALS
RESPIRATION RATE: 18 BRPM | TEMPERATURE: 97.9 F | DIASTOLIC BLOOD PRESSURE: 96 MMHG | SYSTOLIC BLOOD PRESSURE: 156 MMHG | HEART RATE: 104 BPM

## 2021-04-07 DIAGNOSIS — F17.200: ICD-10-CM

## 2021-04-07 DIAGNOSIS — S93.401A: Primary | ICD-10-CM

## 2021-04-07 DIAGNOSIS — S96.911A: ICD-10-CM

## 2021-04-07 DIAGNOSIS — K21.9: ICD-10-CM
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
## 2021-04-07 PROCEDURE — 99283 EMERGENCY DEPT VISIT LOW MDM: CPT
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
## 2021-06-07 ENCOUNTER — HOSPITAL ENCOUNTER (EMERGENCY)
Dept: HOSPITAL 47 - EC | Age: 21
Discharge: HOME | End: 2021-06-07
Payer: COMMERCIAL

## 2021-06-07 VITALS — HEART RATE: 88 BPM | DIASTOLIC BLOOD PRESSURE: 72 MMHG | RESPIRATION RATE: 18 BRPM | SYSTOLIC BLOOD PRESSURE: 100 MMHG

## 2021-06-07 VITALS — TEMPERATURE: 98 F

## 2021-06-07 DIAGNOSIS — F17.200: ICD-10-CM

## 2021-06-07 DIAGNOSIS — O21.9: Primary | ICD-10-CM

## 2021-06-07 DIAGNOSIS — Z3A.12: ICD-10-CM

## 2021-06-07 DIAGNOSIS — O99.331: ICD-10-CM

## 2021-06-07 LAB
ALBUMIN SERPL-MCNC: 4.2 G/DL (ref 3.5–5)
ALP SERPL-CCNC: 57 U/L (ref 38–126)
ALT SERPL-CCNC: 20 U/L (ref 4–34)
ANION GAP SERPL CALC-SCNC: 9 MMOL/L
AST SERPL-CCNC: 23 U/L (ref 14–36)
BASOPHILS # BLD AUTO: 0.1 K/UL (ref 0–0.2)
BASOPHILS NFR BLD AUTO: 1 %
BUN SERPL-SCNC: 8 MG/DL (ref 7–17)
CALCIUM SPEC-MCNC: 9.8 MG/DL (ref 8.4–10.2)
CHLORIDE SERPL-SCNC: 106 MMOL/L (ref 98–107)
CO2 SERPL-SCNC: 22 MMOL/L (ref 22–30)
EOSINOPHIL # BLD AUTO: 0.5 K/UL (ref 0–0.7)
EOSINOPHIL NFR BLD AUTO: 5 %
ERYTHROCYTE [DISTWIDTH] IN BLOOD BY AUTOMATED COUNT: 4.65 M/UL (ref 3.8–5.4)
ERYTHROCYTE [DISTWIDTH] IN BLOOD: 12.8 % (ref 11.5–15.5)
GLUCOSE SERPL-MCNC: 80 MG/DL (ref 74–99)
HCT VFR BLD AUTO: 41.3 % (ref 34–46)
HGB BLD-MCNC: 13.4 GM/DL (ref 11.4–16)
KETONES UR QL STRIP.AUTO: (no result)
LIPASE SERPL-CCNC: 98 U/L (ref 23–300)
LYMPHOCYTES # SPEC AUTO: 3.2 K/UL (ref 1–4.8)
LYMPHOCYTES NFR SPEC AUTO: 33 %
MCH RBC QN AUTO: 28.7 PG (ref 25–35)
MCHC RBC AUTO-ENTMCNC: 32.3 G/DL (ref 31–37)
MCV RBC AUTO: 88.8 FL (ref 80–100)
MONOCYTES # BLD AUTO: 0.4 K/UL (ref 0–1)
MONOCYTES NFR BLD AUTO: 4 %
NEUTROPHILS # BLD AUTO: 5.4 K/UL (ref 1.3–7.7)
NEUTROPHILS NFR BLD AUTO: 56 %
PH UR: 6.5 [PH] (ref 5–8)
PLATELET # BLD AUTO: 270 K/UL (ref 150–450)
POTASSIUM SERPL-SCNC: 3.9 MMOL/L (ref 3.5–5.1)
PROT SERPL-MCNC: 7.1 G/DL (ref 6.3–8.2)
RBC UR QL: 1 /HPF (ref 0–5)
SODIUM SERPL-SCNC: 137 MMOL/L (ref 137–145)
SP GR UR: 1.02 (ref 1–1.03)
SQUAMOUS UR QL AUTO: 12 /HPF (ref 0–4)
UROBILINOGEN UR QL STRIP: <2 MG/DL (ref ?–2)
WBC # BLD AUTO: 9.7 K/UL (ref 3.8–10.6)
WBC # UR AUTO: 6 /HPF (ref 0–5)

## 2021-06-07 PROCEDURE — 80053 COMPREHEN METABOLIC PANEL: CPT

## 2021-06-07 PROCEDURE — 81001 URINALYSIS AUTO W/SCOPE: CPT

## 2021-06-07 PROCEDURE — 83690 ASSAY OF LIPASE: CPT

## 2021-06-07 PROCEDURE — 85025 COMPLETE CBC W/AUTO DIFF WBC: CPT

## 2021-06-07 PROCEDURE — 99284 EMERGENCY DEPT VISIT MOD MDM: CPT

## 2021-06-07 PROCEDURE — 96375 TX/PRO/DX INJ NEW DRUG ADDON: CPT

## 2021-06-07 PROCEDURE — 36415 COLL VENOUS BLD VENIPUNCTURE: CPT

## 2021-06-07 PROCEDURE — 96361 HYDRATE IV INFUSION ADD-ON: CPT

## 2021-06-07 PROCEDURE — 96374 THER/PROPH/DIAG INJ IV PUSH: CPT

## 2021-06-07 NOTE — ED
Nausea/Vomiting/Diarrhea HPI





- General


Chief complaint: Nausea/Vomiting/Diarrhea


Stated complaint: 12weeks preg,tasting blood


Time Seen by Provider: 21 21:17


Source: patient, RN notes reviewed


Mode of arrival: ambulatory


Limitations: no limitations





- History of Present Illness


Initial comments: 


21-year-old female presents emergency from [facility] no nausea vomiting.  Patient is

G to  currently 12 weeks pregnant has been having on and off vomiting 

throughout her pregnancy.  Patient states that she just had an ultrasound and 

appointment with OB/GYN on Friday.  Patient states that she's been given no 

medications.  Patient states cannot tolerate today.  She states she thought she 

tasted blood but did not see any blood.  Denies fevers chills abdominal pain 

cramping vaginal bleeding or vaginal discharge.  No chest pain or shortness 

breath no other complaints.








- Related Data


                                Home Medications











 Medication  Instructions  Recorded  Confirmed


 


No Known Home Medications  21











                                    Allergies











Allergy/AdvReac Type Severity Reaction Status Date / Time


 


venom-honey bee Allergy Severe Rash/Hives Verified 21 20:23


 


bee pollen Allergy  Rash/Hives Verified 21 20:23














Review of Systems


ROS Statement: 


Those systems with pertinent positive or pertinent negative responses have been 

documented in the HPI.





ROS Other: All systems not noted in ROS Statement are negative.





Past Medical History


Past Medical History: GERD/Reflux


Additional Past Medical History / Comment(s): acid reflux. L5 fracture.


History of Any Multi-Drug Resistant Organisms: None Reported


Past Surgical History: Appendectomy, Tonsillectomy


Past Psychological History: Anxiety, Depression


Smoking Status: Current every day smoker


Past Alcohol Use History: None Reported


Past Drug Use History: None Reported





General Exam


Limitations: no limitations


General appearance: alert, in no apparent distress


Head exam: Present: atraumatic, normocephalic, normal inspection


Eye exam: Present: normal appearance, PERRL, EOMI.  Absent: scleral icterus, 

conjunctival injection, periorbital swelling


ENT exam: Present: normal exam, normal oropharynx, mucous membranes moist


Neck exam: Present: normal inspection, full ROM.  Absent: tenderness, 

meningismus, lymphadenopathy


Respiratory exam: Present: normal lung sounds bilaterally.  Absent: respiratory 

distress, wheezes, rales, rhonchi, stridor


Cardiovascular Exam: Present: regular rate, normal rhythm, normal heart sounds. 

Absent: systolic murmur, diastolic murmur, rubs, gallop, clicks


GI/Abdominal exam: Present: soft, normal bowel sounds.  Absent: distended, 

tenderness, guarding, rebound, rigid





Course


                                   Vital Signs











  21





  20:20


 


Temperature 98.0 F


 


Pulse Rate 109 H


 


Respiratory 20





Rate 


 


Blood Pressure 100/66


 


O2 Sat by Pulse 100





Oximetry 














Medical Decision Making





- Medical Decision Making





21-year-old female presented for nausea vomiting pregnancy.  Patient is well-

hydrated she states she feels greatly improved at discharge in stable condition.





- Lab Data


Result diagrams: 


                                 21 21:43





                                 21 21:43


                                   Lab Results











  21 Range/Units





  21:43 21:43 21:43 


 


WBC  9.7    (3.8-10.6)  k/uL


 


RBC  4.65    (3.80-5.40)  m/uL


 


Hgb  13.4    (11.4-16.0)  gm/dL


 


Hct  41.3    (34.0-46.0)  %


 


MCV  88.8    (80.0-100.0)  fL


 


MCH  28.7    (25.0-35.0)  pg


 


MCHC  32.3    (31.0-37.0)  g/dL


 


RDW  12.8    (11.5-15.5)  %


 


Plt Count  270    (150-450)  k/uL


 


MPV  7.3    


 


Neutrophils %  56    %


 


Lymphocytes %  33    %


 


Monocytes %  4    %


 


Eosinophils %  5    %


 


Basophils %  1    %


 


Neutrophils #  5.4    (1.3-7.7)  k/uL


 


Lymphocytes #  3.2    (1.0-4.8)  k/uL


 


Monocytes #  0.4    (0-1.0)  k/uL


 


Eosinophils #  0.5    (0-0.7)  k/uL


 


Basophils #  0.1    (0-0.2)  k/uL


 


Sodium    137  (137-145)  mmol/L


 


Potassium    3.9  (3.5-5.1)  mmol/L


 


Chloride    106  ()  mmol/L


 


Carbon Dioxide    22  (22-30)  mmol/L


 


Anion Gap    9  mmol/L


 


BUN    8  (7-17)  mg/dL


 


Creatinine    0.46 L  (0.52-1.04)  mg/dL


 


Est GFR (CKD-EPI)AfAm    >90  (>60 ml/min/1.73 sqM)  


 


Est GFR (CKD-EPI)NonAf    >90  (>60 ml/min/1.73 sqM)  


 


Glucose    80  (74-99)  mg/dL


 


Calcium    9.8  (8.4-10.2)  mg/dL


 


Total Bilirubin    0.2  (0.2-1.3)  mg/dL


 


AST    23  (14-36)  U/L


 


ALT    20  (4-34)  U/L


 


Alkaline Phosphatase    57  ()  U/L


 


Total Protein    7.1  (6.3-8.2)  g/dL


 


Albumin    4.2  (3.5-5.0)  g/dL


 


Lipase    98  ()  U/L


 


Urine Color   Yellow   


 


Urine Appearance   Cloudy H   (Clear)  


 


Urine pH   6.5   (5.0-8.0)  


 


Ur Specific Gravity   1.024   (1.001-1.035)  


 


Urine Protein   Trace H   (Negative)  


 


Urine Glucose (UA)   Negative   (Negative)  


 


Urine Ketones   2+ H   (Negative)  


 


Urine Blood   Negative   (Negative)  


 


Urine Nitrite   Negative   (Negative)  


 


Urine Bilirubin   Negative   (Negative)  


 


Urine Urobilinogen   <2.0   (<2.0)  mg/dL


 


Ur Leukocyte Esterase   Negative   (Negative)  


 


Urine RBC   1   (0-5)  /hpf


 


Urine WBC   6 H   (0-5)  /hpf


 


Ur Squamous Epith Cells   12 H   (0-4)  /hpf


 


Urine Bacteria   Rare H   (None)  /hpf


 


Urine Mucus   Few H   (None)  /hpf














Disposition


Clinical Impression: 


 Nausea/vomiting in pregnancy





Disposition: HOME SELF-CARE


Condition: Stable


Instructions (If sedation given, give patient instructions):  Nausea and Vom

iting in Pregnancy (ED)


Additional Instructions: 


Please return to the Emergency Department if symptoms worsen or any other 

concerns.


Is patient prescribed a controlled substance at d/c from ED?: No


Referrals: 


Nonstaff,Physician [REFERRING] - 1-2 days


Time of Disposition: 22:22

## 2021-08-25 ENCOUNTER — HOSPITAL ENCOUNTER (OUTPATIENT)
Dept: HOSPITAL 47 - FBPOP | Age: 21
LOS: 1 days | Discharge: HOME | End: 2021-08-26
Attending: OBSTETRICS & GYNECOLOGY
Payer: COMMERCIAL

## 2021-08-25 DIAGNOSIS — O99.332: ICD-10-CM

## 2021-08-25 DIAGNOSIS — F17.200: ICD-10-CM

## 2021-08-25 DIAGNOSIS — Z91.030: ICD-10-CM

## 2021-08-25 DIAGNOSIS — O26.892: Primary | ICD-10-CM

## 2021-08-25 DIAGNOSIS — Z3A.23: ICD-10-CM

## 2021-08-25 DIAGNOSIS — R10.32: ICD-10-CM

## 2021-08-25 LAB
PH UR: 6.5 [PH] (ref 5–8)
RBC UR QL: 1 /HPF (ref 0–5)
SP GR UR: 1.02 (ref 1–1.03)
SQUAMOUS UR QL AUTO: 4 /HPF (ref 0–4)
UROBILINOGEN UR QL STRIP: <2 MG/DL (ref ?–2)
WBC # UR AUTO: 4 /HPF (ref 0–5)

## 2021-08-25 PROCEDURE — 99213 OFFICE O/P EST LOW 20 MIN: CPT

## 2021-08-25 PROCEDURE — 81001 URINALYSIS AUTO W/SCOPE: CPT

## 2021-08-26 VITALS
SYSTOLIC BLOOD PRESSURE: 112 MMHG | DIASTOLIC BLOOD PRESSURE: 67 MMHG | TEMPERATURE: 97.5 F | HEART RATE: 96 BPM | RESPIRATION RATE: 18 BRPM

## 2021-09-03 NOTE — P.MSEPDOC
Presenting Problems





- Arrival Data


Date of Arrival on Unit: 21


Time of Arrival on Unit: 22:11


Mode of Transport: Wheelchair





- Complaint


OB-Reason for Admission/Chief Complaint: Pain


Comment: Left sided pain x3 days





Prenatal Medical History





- Pregnancy Information


: 2


Para: 0


Term: 0


: 0


Abortions: Spontaneous or Elective: 1


Number of Living Children: 0





- Gestational Age


Gestational Age by JAKOB (wks/days): 23 Weeks and 0 Days





- Prenatal History


Pregnancy Complications: Smoker





Review of Systems





- Review of Systems


Constitutional: No problems


Breast: No problems


ENT: No problems


Cardiovascular: No problems


Respiratory: No problems


Gastrointestinal: No problems


Genitourinary: No problems


Musculoskeletal: No problems


Neurological: No problems


Skin: No problems





Vital Signs





- Temperature


Temperature: 97.5 F


Temperature Source: Temporal Artery Scan





- Pulse


  ** Right


Pulse Rate: 96


Pulse Assessment Method: Pulse Oximetry





- Respirations


Respiratory Rate: 18


Oxygen Delivery Method: Room Air


O2 Sat by Pulse Oximetry: 98





- Blood Pressure


  ** Right Arm


Blood Pressure: 112/67


Blood Pressure Mean: 82


Blood Pressure Source: Automatic Cuff





Medical Screen Scoring





- Cervical Exam


Membranes: Intact





- Fetal Assessment - Baby A


Baseline FHR: 135





Physician Notification





- Physician Notified


Physician Notified Date: 21


Physician Notified Time: 23:53


Physician: Lexie Jesus


New Order Received: Yes





- Notification Comment


Comment: pt presents to triage with complaints of lower left sided pain for last

3 days.  states has also had some left back pressure but states pain is mostly 

lower lt side of.  abdomen. pain comes in waves. is mostly sharp rating from 5 

to 9 of 10. Order obtained to collect a UA with culture if needed. RN spoke with

Dr. Jesus and reported to her on UA results. UA not indicitive.  of UTI. Pt may

DC home with previously scheduled apt with Dr. Hogan.





Maternal Fetal Triage Index





- Maternal Fetal Triage Index


Presenting for scheduled procedure w/no complaint: No





- Stat/Priority 1


Stat Priority 1: No





- Urgent/Priority 2


Urgent Priority 2: No





- Prompt/Priority 3


Prompt Priority 3: No





- Non-Urgent/Priority 4


Non-Urgent Priority 4: Yes


Criteria Met for Priority 4: L sided round ligament pain





Disposition





- Disposition


OB Disposition: Discharge to home


Discharge Date: 21


Discharge Time: 00:00


I agree with the RN Medical Screening Exam: Yes


Case reviewed; plan agreed upon as documented in EMR&OBIX.: Yes


Diagnosis: PAIN, UNSPECIFIED

## 2021-10-12 ENCOUNTER — HOSPITAL ENCOUNTER (OUTPATIENT)
Dept: HOSPITAL 47 - FBPOP | Age: 21
Discharge: HOME | End: 2021-10-12
Attending: OBSTETRICS & GYNECOLOGY
Payer: COMMERCIAL

## 2021-10-12 VITALS
TEMPERATURE: 98.2 F | HEART RATE: 114 BPM | RESPIRATION RATE: 16 BRPM | SYSTOLIC BLOOD PRESSURE: 124 MMHG | DIASTOLIC BLOOD PRESSURE: 82 MMHG

## 2021-10-12 DIAGNOSIS — Z91.030: ICD-10-CM

## 2021-10-12 DIAGNOSIS — O26.893: Primary | ICD-10-CM

## 2021-10-12 DIAGNOSIS — R10.2: ICD-10-CM

## 2021-10-12 DIAGNOSIS — Z3A.29: ICD-10-CM

## 2021-10-12 LAB
KETONES UR QL STRIP.AUTO: (no result)
PH UR: 6.5 [PH] (ref 5–8)
RBC UR QL: 3 /HPF (ref 0–5)
SP GR UR: 1 (ref 1–1.03)
SQUAMOUS UR QL AUTO: 2 /HPF (ref 0–4)
UROBILINOGEN UR QL STRIP: <2 MG/DL (ref ?–2)
WBC #/AREA URNS HPF: 6 /HPF (ref 0–5)

## 2021-10-12 PROCEDURE — 59025 FETAL NON-STRESS TEST: CPT

## 2021-10-12 PROCEDURE — 99213 OFFICE O/P EST LOW 20 MIN: CPT

## 2021-10-12 PROCEDURE — 87086 URINE CULTURE/COLONY COUNT: CPT

## 2021-10-12 PROCEDURE — 81001 URINALYSIS AUTO W/SCOPE: CPT

## 2021-10-13 NOTE — P.MSEPDOC
Presenting Problems





- Arrival Data


Date of Arrival on Unit: 10/12/21


Time of Arrival on Unit: 21:46


Mode of Transport: Ambulatory





- Complaint


OB-Reason for Admission/Chief Complaint: Pain, Signs/Symptoms UTI


Comment: suprapubic and vaginal pain





Prenatal Medical History





- Pregnancy Information


: 2


Para: 0


Term: 0


: 0


Abortions: Spontaneous or Elective: 0


Number of Living Children: 0





- Gestational Age


Gestational Age by JAKOB (wks/days): 29 Weeks and 5 Days





Review of Systems





- Review of Systems


Constitutional: No problems


Breast: No problems


ENT: No problems


Cardiovascular: No problems


Respiratory: No problems


Gastrointestinal: No problems


Genitourinary: Dysuria


Musculoskeletal: No problems


Neurological: No problems


Skin: No problems





Vital Signs





- Temperature


Temperature: 98.2 F


Temperature Source: Oral





- Pulse


  ** Right Brachial


Pulse Rate: 114


Pulse Assessment Method: Automatic Cuff





- Respirations


Respiratory Rate: 16


Oxygen Delivery Method: Room Air


O2 Sat by Pulse Oximetry: 98





- Blood Pressure


  ** Right Arm


Blood Pressure: 124/82


Blood Pressure Mean: 96


Blood Pressure Source: Automatic Cuff





Medical Screen Scoring





- Fetal Assessment - Baby A


Baseline FHR: 135


Fetal Heart Rate - NICHD Category: Category I (Normal)


NST: Reactive





Physician Notification





- Physician Notified


Physician Notified Date: 10/12/21


Physician Notified Time: 22:28


Physician: Dr Kurt Saenz Order Received: Yes (send u/a)





Maternal Fetal Triage Index





- Maternal Fetal Triage Index


Presenting for scheduled procedure w/no complaint: No





- Stat/Priority 1


Stat Priority 1: No





- Urgent/Priority 2


Urgent Priority 2: No





- Prompt/Priority 3


Prompt Priority 3: No





- Non-Urgent/Priority 4


Non-Urgent Priority 4: Yes


Criteria Met for Priority 4: common discomforts of pregnancy, ligament pain, 

vaginal pain. possible UTI developing.





Disposition





- Disposition


OB Disposition: Discharge to home, Written follow up instructions reviewed


Discharge Date: 10/12/21


Discharge Time: 23:10


I agree with the RN Medical Screening Exam: Yes


Case reviewed; plan agreed upon as documented in EMR&OBIX.: Yes


Diagnosis: PREGNANCY RELATED CONDITIONS, UNSPECIFIED, THIRD TRIMESTER

## 2021-11-23 ENCOUNTER — HOSPITAL ENCOUNTER (OUTPATIENT)
Dept: HOSPITAL 47 - FBPOP | Age: 21
Discharge: HOME | End: 2021-11-23
Attending: OBSTETRICS & GYNECOLOGY
Payer: COMMERCIAL

## 2021-11-23 VITALS
SYSTOLIC BLOOD PRESSURE: 135 MMHG | TEMPERATURE: 98.1 F | RESPIRATION RATE: 18 BRPM | DIASTOLIC BLOOD PRESSURE: 90 MMHG | HEART RATE: 120 BPM

## 2021-11-23 DIAGNOSIS — Z91.030: ICD-10-CM

## 2021-11-23 DIAGNOSIS — O47.03: Primary | ICD-10-CM

## 2021-11-23 DIAGNOSIS — Z3A.35: ICD-10-CM

## 2021-11-23 DIAGNOSIS — O99.333: ICD-10-CM

## 2021-11-23 DIAGNOSIS — F17.210: ICD-10-CM

## 2021-11-23 PROCEDURE — 99213 OFFICE O/P EST LOW 20 MIN: CPT

## 2021-11-23 PROCEDURE — 59025 FETAL NON-STRESS TEST: CPT

## 2021-11-23 PROCEDURE — 84112 EVAL AMNIOTIC FLUID PROTEIN: CPT

## 2021-11-24 NOTE — P.MSEPDOC
Presenting Problems





- Arrival Data


Date of Arrival on Unit: 21


Time of Arrival on Unit: 15:40


Mode of Transport: Ambulatory





- Complaint


OB-Reason for Admission/Chief Complaint: Rule Out PROM


Comment: pt presents to triage for possible SROM at 1430,





Prenatal Medical History





- Pregnancy Information


: 2


Para: 0


Term: 0


: 0


Abortions: Spontaneous or Elective: 0


Number of Living Children: 0





- Gestational Age


Gestational Age by JAKOB (wks/days): 35 Weeks and 5 Days





Review of Systems





- Review of Systems


Constitutional: No problems


Breast: No problems


ENT: No problems


Cardiovascular: No problems


Respiratory: No problems


Gastrointestinal: No problems


Genitourinary: No problems


Musculoskeletal: No problems


Neurological: No problems


Skin: No problems





Vital Signs





- Temperature


Temperature: 98.1 F


Temperature Source: Temporal Artery Scan





- Pulse


  ** Right Brachial


Pulse Rate: 120


Pulse Assessment Method: Automatic Cuff





- Respirations


Respiratory Rate: 18


Oxygen Delivery Method: Room Air


O2 Sat by Pulse Oximetry: 98





- Blood Pressure


  ** Right Arm


Blood Pressure: 135/90


Blood Pressure Mean: 105


Blood Pressure Source: Automatic Cuff





Medical Screen Scoring





- Cervical Exam


Dilation (cm): 1


Effacement (%): 70


Station: -2


Membranes: Intact





- Uterine Contractions


Frequency From (mins): 5


Frequency To (mins): 8


Duration From (seconds): 30


Duration To (seconds): 60


Intensity: Mild


Resting: Soft to palpation





- Fetal Assessment - Baby A


Baseline FHR: 145


Fetal Heart Rate - NICHD Category: Category I (Normal)


NST: Reactive





Physician Notification





- Physician Notified


Physician Notified Date: 21


Physician Notified Time: 16:26


Physician: Dr. Jesus


New Order Received: Yes





- Notification Comment


Comment: amniosure negative, contractions 5-8 min apart, reactive nst, cervical 

exam 70-2, bp before discharge 129/72, pulse 104, orders to discharge home and

follow up with [facility]





Maternal Fetal Triage Index





- Maternal Fetal Triage Index


Presenting for scheduled procedure w/no complaint: No





- Stat/Priority 1


Stat Priority 1: No





- Urgent/Priority 2


Urgent Priority 2: No





- Prompt/Priority 3


Prompt Priority 3: Yes


Criteria Met for Priority 3: pt 35 5/7 GA, possible SROM at 1430, inital bp 

135/90, pulse 120





Disposition





- Disposition


OB Disposition: Triage, Discharge to home, Written follow up instructions 

reviewed


Discharge Date: 21


Discharge Time: 16:40


I agree with the RN Medical Screening Exam: Yes


Case reviewed; plan agreed upon as documented in EMR&OBIX.: Yes


Diagnosis: FALSE LABOR BEFORE 37 COMPLETED WEEKS OF GEST, THIRD TRI

## 2022-05-26 ENCOUNTER — HOSPITAL ENCOUNTER (OUTPATIENT)
Dept: HOSPITAL 47 - RADUSWWP | Age: 22
Discharge: HOME | End: 2022-05-26
Attending: STUDENT IN AN ORGANIZED HEALTH CARE EDUCATION/TRAINING PROGRAM
Payer: COMMERCIAL

## 2022-05-26 DIAGNOSIS — N60.01: Primary | ICD-10-CM

## 2022-05-26 NOTE — USB
Reason for Exam: Clinical finding. 

Technique: 

Method: Targeted.  





Findings: 

The upper outer quadrant of the left breast, the axilla of the left breast and the retroareolar of

the left breast were scanned.

Dense tissueNo solid or cystic masses are identified.. 





Overall Assessment: Negative, BI-RAD 1





Management: 

Screening Mammogram of both breasts at age 40.

A clinical breast exam by your physician is recommended on an annual basis and results should be

correlated with mammographic findings.



Electronically signed and approved by: Zbigniew Capellan M.D. [employer]

## 2022-05-28 ENCOUNTER — HOSPITAL ENCOUNTER (OUTPATIENT)
Dept: HOSPITAL 47 - LABWHC1 | Age: 22
Discharge: HOME | End: 2022-05-28
Attending: STUDENT IN AN ORGANIZED HEALTH CARE EDUCATION/TRAINING PROGRAM
Payer: COMMERCIAL

## 2022-05-28 DIAGNOSIS — R53.83: ICD-10-CM

## 2022-05-28 DIAGNOSIS — Z83.49: ICD-10-CM

## 2022-05-28 DIAGNOSIS — Z00.00: Primary | ICD-10-CM

## 2022-05-28 LAB
BASOPHILS # BLD AUTO: 0.07 X 10*3/UL (ref 0–0.1)
BASOPHILS NFR BLD AUTO: 1 %
EOSINOPHIL # BLD AUTO: 0.46 X 10*3/UL (ref 0.04–0.35)
EOSINOPHIL NFR BLD AUTO: 6.4 %
ERYTHROCYTE [DISTWIDTH] IN BLOOD BY AUTOMATED COUNT: 4.44 X 10*6/UL (ref 4.1–5.2)
ERYTHROCYTE [DISTWIDTH] IN BLOOD: 13.9 % (ref 11.5–14.5)
HCT VFR BLD AUTO: 39.2 % (ref 37.2–46.3)
HGB BLD-MCNC: 12.3 G/DL (ref 12–15)
IMM GRANULOCYTES BLD QL AUTO: 0.1 %
LYMPHOCYTES # SPEC AUTO: 2.64 X 10*3/UL (ref 0.9–5)
LYMPHOCYTES NFR SPEC AUTO: 36.5 %
MCH RBC QN AUTO: 27.7 PG (ref 27–32)
MCHC RBC AUTO-ENTMCNC: 31.4 G/DL (ref 32–37)
MCV RBC AUTO: 88.3 FL (ref 80–97)
MONOCYTES # BLD AUTO: 0.47 X 10*3/UL (ref 0.2–1)
MONOCYTES NFR BLD AUTO: 6.5 %
NEUTROPHILS # BLD AUTO: 3.58 X 10*3/UL (ref 1.8–7.7)
NEUTROPHILS NFR BLD AUTO: 49.5 %
NRBC BLD AUTO-RTO: 0 /100 WBCS (ref 0–0)
PLATELET # BLD AUTO: 319 X 10*3/UL (ref 140–440)
WBC # BLD AUTO: 7.23 X 10*3/UL (ref 4.5–10)

## 2022-05-28 PROCEDURE — 84439 ASSAY OF FREE THYROXINE: CPT

## 2022-05-28 PROCEDURE — 80053 COMPREHEN METABOLIC PANEL: CPT

## 2022-05-28 PROCEDURE — 85025 COMPLETE CBC W/AUTO DIFF WBC: CPT

## 2022-05-28 PROCEDURE — 80061 LIPID PANEL: CPT

## 2022-05-28 PROCEDURE — 82306 VITAMIN D 25 HYDROXY: CPT

## 2022-05-28 PROCEDURE — 36415 COLL VENOUS BLD VENIPUNCTURE: CPT

## 2022-05-28 PROCEDURE — 84443 ASSAY THYROID STIM HORMONE: CPT

## 2022-05-28 PROCEDURE — 82607 VITAMIN B-12: CPT

## 2022-05-29 LAB
ALBUMIN SERPL-MCNC: 4.3 G/DL (ref 3.8–4.9)
ALBUMIN/GLOB SERPL: 1.75 G/DL (ref 1.6–3.17)
ALP SERPL-CCNC: 63 U/L (ref 41–126)
ALT SERPL-CCNC: 15 U/L (ref 8–44)
ANION GAP SERPL CALC-SCNC: 13.1 MMOL/L (ref 10–18)
AST SERPL-CCNC: 15 U/L (ref 13–35)
BUN SERPL-SCNC: 13 MG/DL (ref 9–27)
BUN/CREAT SERPL: 21 RATIO (ref 12–20)
CALCIUM SPEC-MCNC: 9.3 MG/DL (ref 8.7–10.3)
CHLORIDE SERPL-SCNC: 104 MMOL/L (ref 96–109)
CHOLEST SERPL-MCNC: 151 MG/DL (ref 0–200)
CO2 SERPL-SCNC: 22.7 MMOL/L (ref 20–27.5)
GLOBULIN SER CALC-MCNC: 2.4 G/DL (ref 1.6–3.3)
GLUCOSE SERPL-MCNC: 87 MG/DL (ref 70–110)
HDLC SERPL-MCNC: 57.4 MG/DL (ref 40–60)
LDLC SERPL CALC-MCNC: 82.4 MG/DL (ref 0–131)
POTASSIUM SERPL-SCNC: 4.3 MMOL/L (ref 3.5–5.5)
PROT SERPL-MCNC: 6.7 G/DL (ref 6.2–8.2)
SODIUM SERPL-SCNC: 140 MMOL/L (ref 135–145)
T4 FREE SERPL-MCNC: 1.33 NG/DL (ref 0.8–1.8)
TRIGL SERPL-MCNC: 56.1 MG/DL (ref 0–149)
VIT B12 SERPL-MCNC: 412 PG/ML (ref 200–944)
VLDLC SERPL CALC-MCNC: 11.22 MG/DL (ref 5–40)

## 2022-12-26 ENCOUNTER — HOSPITAL ENCOUNTER (OUTPATIENT)
Dept: HOSPITAL 47 - FBPOP | Age: 22
End: 2022-12-26
Attending: OBSTETRICS & GYNECOLOGY
Payer: COMMERCIAL

## 2022-12-26 VITALS
TEMPERATURE: 97.5 F | HEART RATE: 89 BPM | DIASTOLIC BLOOD PRESSURE: 70 MMHG | SYSTOLIC BLOOD PRESSURE: 120 MMHG | RESPIRATION RATE: 16 BRPM

## 2022-12-26 DIAGNOSIS — Z3A.32: ICD-10-CM

## 2022-12-26 DIAGNOSIS — F17.210: ICD-10-CM

## 2022-12-26 DIAGNOSIS — Z91.030: ICD-10-CM

## 2022-12-26 DIAGNOSIS — O26.893: Primary | ICD-10-CM

## 2022-12-26 DIAGNOSIS — O46.93: ICD-10-CM

## 2022-12-26 LAB
PH UR: 6 [PH] (ref 5–8)
RBC UR QL: >182 /HPF (ref 0–5)
SP GR UR: 1.02 (ref 1–1.03)
SQUAMOUS UR QL AUTO: 27 /HPF (ref 0–4)
UROBILINOGEN UR QL STRIP: <2 MG/DL (ref ?–2)
WBC #/AREA URNS HPF: 34 /HPF (ref 0–5)

## 2022-12-26 PROCEDURE — 99213 OFFICE O/P EST LOW 20 MIN: CPT

## 2022-12-26 PROCEDURE — 81001 URINALYSIS AUTO W/SCOPE: CPT

## 2022-12-26 PROCEDURE — 87086 URINE CULTURE/COLONY COUNT: CPT

## 2022-12-26 PROCEDURE — 59025 FETAL NON-STRESS TEST: CPT

## 2023-01-28 NOTE — P.MSEPDOC
Presenting Problems





- Arrival Data


Date of Arrival on Unit: 22


Time of Arrival on Unit: 18:20


Mode of Transport: Ambulatory





- Complaint


OB-Reason for Admission/Chief Complaint: Possible Onset of Labor, Vaginal 

Bleeding





Prenatal Medical History





- Pregnancy Information


: 3


Para: 1


Term: 0


: 1


Abortions: Spontaneous or Elective: 1


Number of Living Children: 1





- Gestational Age


Gestational Age by JAKOB (wks/days): 32 Weeks and 0 Days





Review of Systems





- Review of Systems


Constitutional: No problems


Breast: No problems


ENT: No problems


Cardiovascular: No problems


Respiratory: No problems


Gastrointestinal: No problems


Genitourinary: No problems


Musculoskeletal: No problems


Neurological: No problems


Skin: No problems





Vital Signs





- Temperature


Temperature: 97.5 F


Temperature Source: Temporal Artery Scan





- Pulse


  ** Right Radial


Pulse Rate: 89


Pulse Assessment Method: Automatic Cuff





- Respirations


Respiratory Rate: 16


Oxygen Delivery Method: Room Air


O2 Sat by Pulse Oximetry: 99





- Blood Pressure


  ** Right Arm


Blood Pressure: 120/70


Blood Pressure Mean: 86


Blood Pressure Source: Automatic Cuff





Medical Screen Scoring





- Cervical Exam


Dilation (cm): 1


Effacement (%): 50


Membranes: Intact





- Uterine Contractions


Frequency From (mins): 0





- Fetal Assessment - Baby A


Baseline FHR: 140


Fetal Heart Rate - NICHD Category: Category I (Normal)


NST: Reactive





Physician Notification





- Physician Notified


Physician Notified Date: 22


Physician Notified Time: 18:55


Physician: Arturo Ricardo Order Received: Yes (home with instructions and to follow up with her dr)





Maternal Fetal Triage Index





- Scheduled/Requesting Priority 5


Scheduled/Requesting Priority 5: Yes


Criteria Met for Priority 5: spotting and cramping today at 1530. after 

intercourse. wood pains at times. lower abd. u/a ulture done . home with 

instructions to see her dr Howie next appt . {chiara heredia [place]}





Disposition





- Disposition


OB Disposition: Physician follow up in office, Discharge to home, Written follow

up instructions reviewed


Discharge Date: 22


Discharge Time: 19:53


I agree with the RN Medical Screening Exam: Yes


Physician's MSE Comment: 





I have neither seen nor examined the patient.


Case reviewed; plan agreed upon as documented in EMR&OBIX.: Yes


Diagnosis: PREGNANCY RELATED CONDITIONS, UNSPECIFIED, THIRD TRIMESTER

## 2024-07-20 ENCOUNTER — HOSPITAL ENCOUNTER (INPATIENT)
Dept: HOSPITAL 47 - 4FBP | Age: 24
LOS: 2 days | Discharge: HOME | DRG: 560 | End: 2024-07-22
Attending: OBSTETRICS & GYNECOLOGY | Admitting: OBSTETRICS & GYNECOLOGY
Payer: COMMERCIAL

## 2024-07-20 DIAGNOSIS — O43.193: ICD-10-CM

## 2024-07-20 DIAGNOSIS — F17.290: ICD-10-CM

## 2024-07-20 DIAGNOSIS — F32.A: ICD-10-CM

## 2024-07-20 DIAGNOSIS — F41.9: ICD-10-CM

## 2024-07-20 DIAGNOSIS — F14.90: ICD-10-CM

## 2024-07-20 DIAGNOSIS — Z91.030: ICD-10-CM

## 2024-07-20 DIAGNOSIS — O36.5930: ICD-10-CM

## 2024-07-20 DIAGNOSIS — Z3A.38: ICD-10-CM

## 2024-07-20 DIAGNOSIS — O42.92: Primary | ICD-10-CM

## 2024-07-20 DIAGNOSIS — K21.9: ICD-10-CM

## 2024-07-20 LAB
BASOPHILS # BLD AUTO: 0 K/UL (ref 0–0.2)
BASOPHILS NFR BLD AUTO: 0 %
EOSINOPHIL # BLD AUTO: 0.1 K/UL (ref 0–0.7)
EOSINOPHIL NFR BLD AUTO: 2 %
ERYTHROCYTE [DISTWIDTH] IN BLOOD BY AUTOMATED COUNT: 4.5 M/UL (ref 3.8–5.4)
ERYTHROCYTE [DISTWIDTH] IN BLOOD: 13.7 % (ref 11.5–15.5)
HCT VFR BLD AUTO: 38.5 % (ref 34–46)
HGB BLD-MCNC: 12.8 GM/DL (ref 11.4–16)
LYMPHOCYTES # SPEC AUTO: 1.9 K/UL (ref 1–4.8)
LYMPHOCYTES NFR SPEC AUTO: 29 %
MCH RBC QN AUTO: 28.4 PG (ref 25–35)
MCHC RBC AUTO-ENTMCNC: 33.2 G/DL (ref 31–37)
MCV RBC AUTO: 85.5 FL (ref 80–100)
MONOCYTES # BLD AUTO: 0.3 K/UL (ref 0–1)
MONOCYTES NFR BLD AUTO: 5 %
NEUTROPHILS # BLD AUTO: 4.1 K/UL (ref 1.3–7.7)
NEUTROPHILS NFR BLD AUTO: 63 %
PLATELET # BLD AUTO: 253 K/UL (ref 150–450)
WBC # BLD AUTO: 6.6 K/UL (ref 3.8–10.6)

## 2024-07-20 PROCEDURE — 88307 TISSUE EXAM BY PATHOLOGIST: CPT

## 2024-07-20 PROCEDURE — 80355 GABAPENTIN NON-BLOOD: CPT

## 2024-07-20 PROCEDURE — 3E033VJ INTRODUCTION OF OTHER HORMONE INTO PERIPHERAL VEIN, PERCUTANEOUS APPROACH: ICD-10-PCS

## 2024-07-20 PROCEDURE — 85025 COMPLETE CBC W/AUTO DIFF WBC: CPT

## 2024-07-20 PROCEDURE — 86850 RBC ANTIBODY SCREEN: CPT

## 2024-07-20 PROCEDURE — 80364 OPIOID &OPIATE ANALOG 5/MORE: CPT

## 2024-07-20 PROCEDURE — 59025 FETAL NON-STRESS TEST: CPT

## 2024-07-20 PROCEDURE — 86762 RUBELLA ANTIBODY: CPT

## 2024-07-20 PROCEDURE — 86900 BLOOD TYPING SEROLOGIC ABO: CPT

## 2024-07-20 PROCEDURE — 80306 DRUG TEST PRSMV INSTRMNT: CPT

## 2024-07-20 PROCEDURE — 99213 OFFICE O/P EST LOW 20 MIN: CPT

## 2024-07-20 PROCEDURE — 86901 BLOOD TYPING SEROLOGIC RH(D): CPT

## 2024-07-20 PROCEDURE — 80347 BENZODIAZEPINES 13 OR MORE: CPT

## 2024-07-20 PROCEDURE — 80326 AMPHETAMINES 5 OR MORE: CPT

## 2024-07-20 RX ADMIN — AMPICILLIN SODIUM SCH MLS/HR: 1 INJECTION, POWDER, FOR SOLUTION INTRAMUSCULAR; INTRAVENOUS at 22:24

## 2024-07-20 RX ADMIN — AMPICILLIN STA MLS/HR: 2 INJECTION, POWDER, FOR SOLUTION INTRAVENOUS at 18:23

## 2024-07-20 RX ADMIN — POTASSIUM CHLORIDE SCH MLS/HR: 14.9 INJECTION, SOLUTION INTRAVENOUS at 18:26

## 2024-07-20 RX ADMIN — OXYTOCIN-SODIUM CHLORIDE 0.9% IV SOLN 30 UNIT/500ML SCH MLS/HR: 30-0.9/5 SOLUTION at 23:45

## 2024-07-20 RX ADMIN — NALBUPHINE HYDROCHLORIDE PRN MG: 10 INJECTION, SOLUTION INTRAMUSCULAR; INTRAVENOUS; SUBCUTANEOUS at 20:58

## 2024-07-20 NOTE — P.PROBDLV
Vaginal Delivery Note





- .


Vaginal Delivery Note: 





24 year old  presents at 38 weeks 1 day with spontaneous rupture of 

membranes at 1530. When she presented she was 3/80/-2 and igor 

irregularly. Fetal heart tones Category 1.The patient progressed quickly.  She 

did get 1 dose of Nubain and then used some nitrous for pain control.  Her 

cervix was completely dilated around 2335.  She pushed, delivered a viable 

female infant over intact perineum at 2342.  Head delivered OA, anterior 

shoulder delivered gentle downward guidance.  Posterior shoulder and rest of 

body.  Nose and mouth bulb suctioned, cord clamped and cut, infant placed 

mother's abdomen.  Apgars 8, 9, weight 5 lbs. 14 oz.  Placenta delivered sp

ontaneously, intact with three-vessel cord at 2345.  Vagina, cervix, perineum 

inspected.  No lacerations noted.  Estimated blood loss 150 mL.  Mother and baby

in stable condition.

## 2024-07-20 NOTE — P.MSEPDOC
Presenting Problems





- Arrival Data


Date of Arrival on Unit: 24


Time of Arrival on Unit: 17:00


Mode of Transport: Ambulatory





- Complaint


OB-Reason for Admission/Chief Complaint: Possible Onset of Labor, Rule Out SROM





Prenatal Medical History





- Pregnancy Information


: 4


Para: 2


Term: 1


: 1


Abortions: Spontaneous or Elective: 1


Number of Living Children: 2





- Gestational Age


Gestational Age by JAKOB (wks/days): 38 Weeks and 1 Days





- Prenatal History


Pregnancy Complications: GBS+


Comment: limited prenatal care





Review of Systems





- Review of Systems


Constitutional: No problems


Breast: No problems


ENT: No problems


Cardiovascular: No problems


Respiratory: No problems


Gastrointestinal: No problems


Genitourinary: No problems


Musculoskeletal: No problems


Neurological: No problems


Skin: No problems





Vital Signs





- Temperature


Temperature: 97.0 F


Temperature Source: Oral





- Pulse


  ** Right Brachial


Pulse Rate: 100


Pulse Assessment Method: Automatic Cuff





- Respirations


Respiratory Rate: 16


Oxygen Delivery Method: Room Air





- Blood Pressure


  ** Right Arm


Blood Pressure: 119/84


Blood Pressure Mean: 95


Blood Pressure Source: Automatic Cuff





Medical Screen Scoring





- Cervical Exam


Dilation (cm): 3


Effacement (%): 90


Station: -2


Membranes: Ruptured





- Uterine Contractions


Frequency From (mins): 4


Frequency To (mins): 5


Duration From (seconds): 50


Duration To (seconds): 60


Intensity: Moderate


Resting: Soft to palpation





- Fetal Assessment - Baby A


Baseline FHR: 135


Fetal Heart Rate - NICHD Category: Category I (Normal)


NST: Reactive





Physician Notification





- Physician Notified


Physician Notified Date: 24


Physician Notified Time: 17:45


Physician: Leixe Jesus


New Order Received: Yes





- Notification Comment


Comment: admit for labor





Maternal Fetal Triage Index





- Maternal Fetal Triage Index


Presenting for scheduled procedure w/no complaint: No





- Stat/Priority 1


Stat Priority 1: No





- Urgent/Priority 2


Urgent Priority 2: No





- Prompt/Priority 3


Prompt Priority 3: Yes


Criteria Met for Priority 3: SROM





Disposition





- Disposition


OB Disposition: Admit


I agree with the RN Medical Screening Exam: Yes


Case reviewed; plan agreed upon as documented in EMR&OBIX.: Yes


Diagnosis: ENCOUNTER FOR FULL-TERM UNCOMPLICATED DELIVERY

## 2024-07-20 NOTE — P.HPOB
History of Present Illness


H&P Date: 24


Chief Complaint: SROM





24 year old  presents at 38 weeks 1 day with spontaneous rupture of 

membranes at 1530. When she presented she was 3/80/-2 and igor 

irregularly. Fetal heart tones Category 1.





Review of Systems


All systems: negative


Constitutional: Denies chills, Denies fever


Eyes: denies blurred vision, denies pain


Ears, nose, mouth and throat: Denies headache, Denies sore throat


Cardiovascular: Denies chest pain, Denies shortness of breath


Respiratory: Denies cough


Gastrointestinal: Denies abdominal pain, Denies diarrhea, Denies nausea, Denies 

vomiting


Genitourinary: Denies dysuria, Denies hematuria


Musculoskeletal: Denies myalgias


Integumentary: Denies pruritus, Denies rash


Neurological: Denies numbness, Denies weakness


Psychiatric: Denies anxiety, Denies depression


Endocrine: Denies fatigue, Denies weight change





Past Medical History


Past Medical History: GERD/Reflux


Additional Past Medical History / Comment(s): acid reflux. L5 fracture.  OB 

History: She had one SAB, 2 NVD at 36 and 37 weeks. She has had prenatal care 

with Dr Pringle out [facility] with this pregnancy. O+, abs neg, Rub Imm, RPR NR, 

Hep B neg, HIV NR, marginal cord insertion and IUGR. GBS unknown


History of Any Multi-Drug Resistant Organisms: None Reported


Past Surgical History: Appendectomy, Tonsillectomy


Past Anesthesia/Blood Transfusion Reactions: No Reported Reaction


Past Psychological History: Anxiety, Depression


Smoking Status: Vaper


Past Alcohol Use History: None Reported


Past Drug Use History: None Reported





- Past Family History


  ** Father


Family Medical History: No Reported History





Medications and Allergies


                                Home Medications











 Medication  Instructions  Recorded  Confirmed  Type


 


No Known Home Medications  24 History








                                    Allergies











Allergy/AdvReac Type Severity Reaction Status Date / Time


 


venom-honey bee Allergy Severe Rash/Hives Verified 24 17:49


 


bee pollen Allergy  Rash/Hives Verified 24 17:49














Exam


Osteopathic Statement: *.  No significant issues noted on an osteopathic struc

tural exam other than those noted in the History and Physical/Consult.


                                   Vital Signs











  Temp Pulse Resp BP


 


 24 18:36  97.0 F L  100  16  119/84








                                Intake and Output











 07/24





 06:59 14:59 22:59


 


Other:   


 


  Weight   71.668 kg














Heart: Regular rate and rhythm


Lungs: Clear to auscultation bilaterally


Abdomen: Soft, nontender


Extremities: Negative Homans sign





Results


Result Diagrams: 


                                 24 18:00





                  Abnormal Lab Results - Last 24 Hours (Table)











  24 Range/Units





  18:00 


 


Urine Cocaine Screen  Detected H  (NotDetected)  














Assessment and Plan


(1) SROM (spontaneous rupture of membranes)


Current Visit: Yes   Status: Acute   Code(s): RPQ5936 -    SNOMED Code(s): 

023356651


   





(2) 38 weeks gestation of pregnancy


Current Visit: Yes   Status: Acute   Code(s): Z3A.38 - 38 WEEKS GESTATION OF 

PREGNANCY   SNOMED Code(s): 88016077


   


Plan: 





1. admit to [facility]


2. pitocin augmentation if necessary


3. anticipate normal vaginal delivery

## 2024-07-21 LAB
BASOPHILS # BLD AUTO: 0 K/UL (ref 0–0.2)
BASOPHILS NFR BLD AUTO: 0 %
EOSINOPHIL # BLD AUTO: 0.1 K/UL (ref 0–0.7)
EOSINOPHIL NFR BLD AUTO: 1 %
ERYTHROCYTE [DISTWIDTH] IN BLOOD BY AUTOMATED COUNT: 3.83 M/UL (ref 3.8–5.4)
ERYTHROCYTE [DISTWIDTH] IN BLOOD: 13.8 % (ref 11.5–15.5)
HCT VFR BLD AUTO: 34.2 % (ref 34–46)
HGB BLD-MCNC: 11 GM/DL (ref 11.4–16)
LYMPHOCYTES # SPEC AUTO: 1.2 K/UL (ref 1–4.8)
LYMPHOCYTES NFR SPEC AUTO: 12 %
MCH RBC QN AUTO: 28.7 PG (ref 25–35)
MCHC RBC AUTO-ENTMCNC: 32.2 G/DL (ref 31–37)
MCV RBC AUTO: 89.2 FL (ref 80–100)
MONOCYTES # BLD AUTO: 0.5 K/UL (ref 0–1)
MONOCYTES NFR BLD AUTO: 5 %
NEUTROPHILS # BLD AUTO: 8.4 K/UL (ref 1.3–7.7)
NEUTROPHILS NFR BLD AUTO: 82 %
PLATELET # BLD AUTO: 186 K/UL (ref 150–450)
WBC # BLD AUTO: 10.2 K/UL (ref 3.8–10.6)

## 2024-07-21 RX ADMIN — IBUPROFEN PRN MG: 600 TABLET ORAL at 01:19

## 2024-07-21 RX ADMIN — ACETAMINOPHEN PRN MG: 325 TABLET, FILM COATED ORAL at 08:53

## 2024-07-21 RX ADMIN — DOCUSATE SODIUM AND SENNOSIDES SCH EACH: 50; 8.6 TABLET ORAL at 08:53

## 2024-07-21 NOTE — P.PNOBGVD
Subjective





- Subjective


Principal diagnosis: Status post normal vaginal delivery postpartum day #1


Interval history: 





Patient seen and examined.  Patient is pretty distressed about how her urine 

drug screen showed positive for cocaine.  I told her I would run another one but

it doesn't mean that the first one wasn't positive.  The baby is in the nursery 

and we had a long discussion about how babies will be monitored for signs of 

withdrawal and I encouraged her to talk to the pediatrician.


Patient reports: Reports appetite normal, Reports voiding normally, Reports pain

well controlled, Reports ambulating normally





Objective





- Latest Vital Signs


Latest vital signs: 


                                   Vital Signs











  Temp Pulse Resp BP Pulse Ox


 


 07/21/24 04:00   80   122/77 


 


 07/21/24 01:54  97.5 F L  85  18  124/78 


 


 07/21/24 01:39   85  20  123/76 


 


 07/21/24 01:24   76  18  123/79 


 


 07/21/24 01:09   86  18  123/76 


 


 07/21/24 00:54   105 H  18  131/79 


 


 07/21/24 00:39   99  20  146/86 


 


 07/21/24 00:24   96  18  141/87 


 


 07/21/24 00:09   78  18  114/69 


 


 07/20/24 23:54  97.7 F  88  18  141/94  99


 


 07/20/24 22:18  97.0 F L  100  16  119/84 


 


 07/20/24 18:36  97.0 F L  100  16  119/84 








                                Intake and Output











 07/20/24 07/21/24 07/21/24





 22:59 06:59 14:59


 


Intake Total  167 


 


Output Total  450 


 


Balance  -283 


 


Intake:   


 


  Intake, IV Titration  167 





  Amount   


 


    Oxytocin 30 Units/500 ml  167 





    Ns 30 unit In Saline 1   





    500ml.bag @ Per Protocol   





    IV .Q0M [facility] Rx#:[id number]   


 


Output:   


 


  Estimated Blood Loss  150 


 


  Output, Quantitative  300 





  Blood Loss   


 


Other:   


 


  # Voids 2 1 


 


  Weight 71.668 kg  














- Exam


Lungs: bilateral: normal


Chest: Normal S1, Normal S2


Extremities: Present: normal


Abdomen: Present: normal appearance, soft


Uterus: Present: normal, firm





- Labs


Labs: 


                  Abnormal Lab Results - Last 24 Hours (Table)











  07/20/24 07/21/24 07/21/24 Range/Units





  18:00 03:38 03:38 


 


Hgb    11.0 L  (11.4-16.0)  gm/dL


 


Neutrophils #    8.4 H  (1.3-7.7)  k/uL


 


Urine Cocaine Screen  Detected H    (NotDetected)  


 


Rubella IgG Antibody   13.20 H   (0.00-9.00)  IU/mL














Assessment and Plan


(1) SROM (spontaneous rupture of membranes)


Current Visit: Yes   Status: Resolved   Code(s): [id number] -    SNOMED Code(s): 

332225455


   





(2) 38 weeks gestation of pregnancy


Current Visit: Yes   Status: Resolved   Code(s): Z3A.38 - 38 WEEKS GESTATION OF 

PREGNANCY   SNOMED Code(s): 77989914


   





(3) Status post normal vaginal delivery


Current Visit: Yes   Status: Acute   Code(s): DMC9430 -    SNOMED Code(s): 

780481284


   


Plan: 





1.  Continue postpartum care

## 2024-07-22 VITALS
RESPIRATION RATE: 16 BRPM | SYSTOLIC BLOOD PRESSURE: 99 MMHG | TEMPERATURE: 98.2 F | DIASTOLIC BLOOD PRESSURE: 58 MMHG | HEART RATE: 69 BPM

## 2024-07-22 NOTE — P.DS
Providers


Date of admission: 


07/20/24 17:29





Expected date of discharge: 07/22/24


Attending physician: 


Lexie Jesus





Primary care physician: 


Stated None








- Discharge Diagnosis(es)


(1) SROM (spontaneous rupture of membranes)


Current Visit: Yes   Status: Resolved   





(2) 38 weeks gestation of pregnancy


Current Visit: Yes   Status: Resolved   





(3) Status post normal vaginal delivery


Current Visit: Yes   Status: Acute   


Hospital Course: 





She presented with spontaneous rupture membranes.  She underwent a normal 

vaginal delivery.  Postpartum course has been uneventful.  She denies nausea, 

vomiting, chest pain, shortness of breath or calf pain.  Patient will be 

discharged home postpartum day #2 in stable condition to follow-up with me in 6 

weeks.





Plan - Discharge Summary


New Discharge Prescriptions: 


New


   Ibuprofen [Motrin] 600 mg PO Q6HR PRN #30 tab


     PRN Reason: Mild Pain (Scale 1 To 3)


Discharge Medication List





Ibuprofen [Motrin] 600 mg PO Q6HR PRN #30 tab 07/22/24 [Rx]








Follow up Appointment(s)/Referral(s): 


Lexie Jesus DO [Doctor of Osteopathic Medicine] - 6 Weeks


Activity/Diet/Wound Care/Special Instructions: 


6 Week Apt 09/03/2024 @1:15pm 


Discharge Disposition: HOME SELF-CARE